# Patient Record
Sex: FEMALE | Race: WHITE | NOT HISPANIC OR LATINO | Employment: OTHER | URBAN - METROPOLITAN AREA
[De-identification: names, ages, dates, MRNs, and addresses within clinical notes are randomized per-mention and may not be internally consistent; named-entity substitution may affect disease eponyms.]

---

## 2017-02-04 ENCOUNTER — HOSPITAL ENCOUNTER (EMERGENCY)
Facility: CLINIC | Age: 67
Discharge: HOME | End: 2017-02-04
Attending: FAMILY MEDICINE | Admitting: FAMILY MEDICINE
Payer: MEDICARE

## 2017-02-04 VITALS
SYSTOLIC BLOOD PRESSURE: 138 MMHG | OXYGEN SATURATION: 97 % | TEMPERATURE: 97.9 F | DIASTOLIC BLOOD PRESSURE: 89 MMHG | HEART RATE: 76 BPM

## 2017-02-04 DIAGNOSIS — M54.5 ACUTE LOW BACK PAIN, UNSPECIFIED BACK PAIN LATERALITY, WITH SCIATICA PRESENCE UNSPECIFIED: ICD-10-CM

## 2017-02-04 LAB
ALBUMIN UR-MCNC: NEGATIVE MG/DL
APPEARANCE UR: CLEAR
BACTERIA SPEC CULT: NORMAL
BILIRUB UR QL STRIP: NEGATIVE
COLOR UR AUTO: ABNORMAL
GLUCOSE UR STRIP-MCNC: NEGATIVE MG/DL
HGB UR QL STRIP: NEGATIVE
KETONES UR STRIP-MCNC: NEGATIVE MG/DL
LEUKOCYTE ESTERASE UR QL STRIP: NEGATIVE
MICRO REPORT STATUS: NORMAL
NITRATE UR QL: NEGATIVE
PH UR STRIP: 7 PH (ref 5–7)
RBC #/AREA URNS AUTO: <1 /HPF (ref 0–2)
SP GR UR STRIP: 1 (ref 1–1.03)
SPECIMEN SOURCE: NORMAL
URN SPEC COLLECT METH UR: ABNORMAL
UROBILINOGEN UR STRIP-MCNC: NORMAL MG/DL (ref 0–2)
WBC #/AREA URNS AUTO: <1 /HPF (ref 0–2)

## 2017-02-04 PROCEDURE — 99283 EMERGENCY DEPT VISIT LOW MDM: CPT | Performed by: FAMILY MEDICINE

## 2017-02-04 PROCEDURE — 87086 URINE CULTURE/COLONY COUNT: CPT | Performed by: FAMILY MEDICINE

## 2017-02-04 PROCEDURE — 81001 URINALYSIS AUTO W/SCOPE: CPT | Performed by: FAMILY MEDICINE

## 2017-02-04 PROCEDURE — 99283 EMERGENCY DEPT VISIT LOW MDM: CPT

## 2017-02-04 RX ORDER — CIPROFLOXACIN 500 MG/1
500 TABLET, FILM COATED ORAL 2 TIMES DAILY
Qty: 14 TABLET | Refills: 0 | Status: SHIPPED | OUTPATIENT
Start: 2017-02-04 | End: 2017-02-11

## 2017-02-04 ASSESSMENT — ENCOUNTER SYMPTOMS
ABDOMINAL PAIN: 1
CARDIOVASCULAR NEGATIVE: 1
HEMATOLOGIC/LYMPHATIC NEGATIVE: 1
PSYCHIATRIC NEGATIVE: 1
ENDOCRINE NEGATIVE: 1
CONSTITUTIONAL NEGATIVE: 1
EYES NEGATIVE: 1
MUSCULOSKELETAL NEGATIVE: 1
ALLERGIC/IMMUNOLOGIC NEGATIVE: 1
NEUROLOGICAL NEGATIVE: 1
RESPIRATORY NEGATIVE: 1

## 2017-02-04 NOTE — ED AVS SNAPSHOT
[facility] Emergency Department    [address] [address]    Phone:  [phone]    Fax:  [phone]                                       Alona Sosa   MRN: [id number]    Department:  [facility] Emergency Department   Date of Visit:  2/4/2017           After Visit Summary Signature Page     I have received my discharge instructions, and my questions have been answered. I have discussed any challenges I see with this plan with the nurse or doctor.    ..........................................................................................................................................  Patient/Patient Representative Signature      ..........................................................................................................................................  Patient Representative Print Name and Relationship to Patient    ..................................................               ................................................  Date                                            Time    ..........................................................................................................................................  Reviewed by Signature/Title    ...................................................              ..............................................  Date                                                            Time

## 2017-02-04 NOTE — ED AVS SNAPSHOT
[facility] Emergency Department    [address] [address]    Phone:  [phone]    Fax:  [phone]                                       Alona Sosa   MRN: [id number]    Department:  [facility] Emergency Department   Date of Visit:  2/4/2017           Patient Information     Date Of Birth          1950        Your diagnoses for this visit were:     Acute low back pain, unspecified back pain laterality, with sciatica presence unspecified Etiology unclear       You were seen by Rashid Randolph MD.      Follow-up Information     Schedule an appointment as soon as possible for a visit with Tra Browning MD.    Specialty:  INTERNAL MEDICINE - ENDOCRINOLOGY, DIABETES & METABOLISM    Why:  As needed, If symptoms worsen    Contact information:    [facility]  [address]  [facility] [address] [phone]          Discharge Instructions       Empiric treatment with antibiotic pending results of urine culture.  Push fluids, rest.  If not improving return to the emergency.      24 Hour Appointment Hotline       To make an appointment at any [facility] clinic, call [phone] ([phone]). If you don't have a family doctor or clinic, we will help you find one. [facility] clinics are conveniently located to serve the needs of you and your family.             Review of your medicines      START taking        Dose / Directions Last dose taken    ciprofloxacin 500 MG tablet   Commonly known as:  CIPRO   Dose:  500 mg   Quantity:  14 tablet        Take 1 tablet (500 mg) by mouth 2 times daily for 7 days   Refills:  0          Our records show that you are taking the medicines listed below. If these are incorrect, please call your family doctor or clinic.        Dose / Directions Last dose taken    ARMOUR THYROID PO   Dose:  60 mg        Take 60 mg by mouth daily   Refills:  0        CLONIDINE HCL PO   Dose:  0.1 mg        Take 0.1 mg by mouth at onset of  headache  med   Refills:  0        co-enzyme Q-10 100 MG Caps capsule        Take  by mouth daily.   Refills:  0        fish oil-omega-3 fatty acids 1000 MG capsule   Dose:  2 g        Take 2 g by mouth daily.   Refills:  0        LEVOTHROID PO   Dose:  50 mcg        Take 50 mcg by mouth   Refills:  0        losartan-hydrochlorothiazide 100-12.5 MG per tablet   Commonly known as:  HYZAAR   Dose:  1 tablet   Quantity:  30 tablet        Take 1 tablet by mouth daily   Refills:  11        vitamin E 400 UNITS Tabs   Dose:  400 Units        Take 400 Units by mouth daily.   Refills:  0                Prescriptions were sent or printed at these locations (1 Prescription)                   [facility] [place], [place] - [address]   [address] [place] [address]    Telephone:  [phone]   Fax:  [phone]   Hours:                  E-Prescribed (1 of 1)         ciprofloxacin (CIPRO) 500 MG tablet                Procedures and tests performed during your visit     UA with Microscopic      Orders Needing Specimen Collection     None      Pending Results     No orders found from 2/3/2017 to 2017.            Pending Culture Results     No orders found from 2/3/2017 to 2017.       Test Results from your hospital stay           2017  4:17 PM - Interface, [place] Results      Component Results     Component Value Ref Range & Units Status    Color Urine Straw  Final    Appearance Urine Clear  Final    Glucose Urine Negative NEG mg/dL Final    Bilirubin Urine Negative NEG Final    Ketones Urine Negative NEG mg/dL Final    Specific Gravity Urine 1.002 (L) 1.003 - 1.035 Final    Blood Urine Negative NEG Final    pH Urine 7.0 5.0 - 7.0 pH Final    Protein Albumin Urine Negative NEG mg/dL Final    Urobilinogen mg/dL Normal 0.0 - 2.0 mg/dL Final    Nitrite Urine Negative NEG Final    Leukocyte Esterase Urine Negative NEG Final    Source Midstream Urine  Final    WBC Urine <1 0 - 2 /HPF  Final    RBC Urine <1 0 - 2 /HPF Final                Thank you for choosing [facility]       Thank you for choosing [facility] for your care. Our goal is always to provide you with excellent care. Hearing back from our patients is one way we can continue to improve our services. Please take a few minutes to complete the written survey that you may receive in the mail after you visit with us. Thank you!        [employer] Information     [facility] gives you secure access to your electronic health record. If you see a primary care provider, you can also send messages to your care team and make appointments. If you have questions, please call your primary care clinic.  If you do not have a primary care provider, please call [phone] and they will assist you.        Care EveryWhere ID     This is your Care EveryWhere ID. This could be used by other organizations to access your [facility] medical records  [id number]        After Visit Summary       This is your record. Keep this with you and show to your community pharmacist(s) and doctor(s) at your next visit.

## 2017-02-04 NOTE — DISCHARGE INSTRUCTIONS
Empiric treatment with antibiotic pending results of urine culture.  Push fluids, rest.  If not improving return to the emergency.

## 2017-02-04 NOTE — ED PROVIDER NOTES
History     Chief Complaint   Patient presents with     Flank Pain     uti 5 weeks ago now has low back pain and low abd pain     HPI  Alona Sosa is a 66 year old female, past medical history is significant for headache, hypertension, hypothyroidism, presents to the emergency department with concerns of bilateral low back pain and lower abdominal pain as well as urgency but no frequency or dysuria.  Symptoms will last 48 hours.  The patient states that beginning approximately 5 weeks ago she had urinary frequency urgency and dysuria which she treated with cranberry juice and fluids and seemed to improve.  She has had sporadic symptoms over the last couple of weeks up until the last 48 hours when symptoms seemed to increase.  She can think of no other reason to have low back pain or abdominal pain no lifting or straining pending etc.  She just returned from a cruise where she admittedly ate more than she should have.  She denies any diarrhea, no constipation.  No fever chills or sweats.    Active Ambulatory Problems     Diagnosis Date Noted     Headache 10/17/2005     Essential hypertension, malignant 06/08/2015     Vaccine refused by patient 10/25/2016     Resolved Ambulatory Problems     Diagnosis Date Noted     Closed fracture of metatarsal bone 03/20/2007     Past Medical History   Diagnosis Date     Unspecified hypothyroidism      Unspecified essential hypertension      No past surgical history on file.  Social History     Social History     Marital Status:      Spouse Name: N/A     Number of Children: N/A     Years of Education: N/A     Occupational History     Not on file.     Social History Main Topics     Smoking status: Former Smoker     Quit date: 09/29/1975     Smokeless tobacco: Never Used     Alcohol Use: Yes      Comment: OCC     Drug Use: No     Sexual Activity: Not on file     Other Topics Concern     Not on file     Social History Narrative     Family History   Problem Relation Age of  Onset     Hypertension Mother      Eye Disorder Mother      Respiratory Father      TB     CEREBROVASCULAR DISEASE Father      DIABETES Father      Alcohol/Drug Father      Hypertension Father      DIABETES Maternal Grandmother      CEREBROVASCULAR DISEASE Maternal Grandmother      Hypertension Maternal Grandmother      Blood Disease Maternal Grandmother      Respiratory Maternal Grandfather      TB     Alcohol/Drug Maternal Grandfather      Hypertension Sister      HEART DISEASE Sister      GASTROINTESTINAL DISEASE Daughter      Neurologic Disorder Daughter      BRAIN TUMOR     Hypertension Daughter      GASTROINTESTINAL DISEASE Daughter      No current facility-administered medications for this encounter.     Current Outpatient Prescriptions   Medication     ciprofloxacin (CIPRO) 500 MG tablet     Levothyroxine Sodium (LEVOTHROID PO)     ARMOUR THYROID PO     losartan-hydrochlorothiazide (HYZAAR) 100-12.5 MG per tablet     CLONIDINE HCL PO     co-enzyme Q-10 (COQ10) 100 MG CAPS     fish oil-omega-3 fatty acids (FISH OIL) 1000 MG capsule     vitamin E 400 UNIT TABS        Allergies   Allergen Reactions     Macrobid [Nitrofurantoin Anhydrous] Swelling       I have reviewed the Medications, Allergies, Past Medical and Surgical History, and Social History in the Epic system.    Review of Systems   Constitutional: Negative.    HENT: Negative.    Eyes: Negative.    Respiratory: Negative.    Cardiovascular: Negative.    Gastrointestinal: Positive for abdominal pain.   Endocrine: Negative.    Genitourinary: Positive for urgency and pelvic pain.   Musculoskeletal: Negative.    Skin: Negative.    Allergic/Immunologic: Negative.    Neurological: Negative.    Hematological: Negative.    Psychiatric/Behavioral: Negative.        Physical Exam   BP: (!) 177/92 mmHg  Pulse: 76  Temp: 97.9  F (36.6  C)  SpO2: 95 %  Physical Exam   Constitutional: She is oriented to person, place, and time. She appears well-developed and  well-nourished.   Does not appear ill or toxic.   Abdominal: Soft. Bowel sounds are normal. She exhibits no mass. There is tenderness. There is no rebound and no guarding.   Mild tenderness suprapubic area without rebound or guarding.  Minimal low back tenderness to palpation.  No CVA area tenderness.   Musculoskeletal: Normal range of motion.   Neurological: She is alert and oriented to person, place, and time.   Skin: Skin is warm and dry.   Psychiatric: She has a normal mood and affect. Her behavior is normal.   Nursing note and vitals reviewed.      ED Course   Procedures             Critical Care time:  none               Labs Ordered and Resulted from Time of ED Arrival Up to the Time of Departure from the ED   ROUTINE UA WITH MICROSCOPIC - Abnormal; Notable for the following:     Specific Gravity Urine 1.002 (*)     All other components within normal limits   URINE CULTURE AEROBIC BACTERIAL   5:10 PM  Lack of findings on urinalysis are reviewed with the patient.  Culture sent.  Patient has an abdominal exam which is benign, stable normal adult range vital signs.  My suspicion for an alternative more concerning diagnosis at this point is very low.  I discussed the option of evaluating this further with lab diagnostics with the patient versus empiric treatment and awaiting culture results.  She elected the latter.      Assessments & Plan (with Medical Decision Making)     I have reviewed the nursing notes.    I have reviewed the findings, diagnosis, plan and need for follow up with the patient.    New Prescriptions    CIPROFLOXACIN (CIPRO) 500 MG TABLET    Take 1 tablet (500 mg) by mouth 2 times daily for 7 days       Final diagnoses:   Acute low back pain, unspecified back pain laterality, with sciatica presence unspecified - Etiology unclear       2/4/2017   [facility] EMERGENCY DEPARTMENT      Rashid Randolph MD  02/04/17 5615

## 2017-02-06 LAB
BACTERIA SPEC CULT: NORMAL
MICRO REPORT STATUS: NORMAL
SPECIMEN SOURCE: NORMAL

## 2017-03-16 ENCOUNTER — HOSPITAL ENCOUNTER (EMERGENCY)
Facility: CLINIC | Age: 67
Discharge: HOME | End: 2017-03-16
Attending: EMERGENCY MEDICINE | Admitting: EMERGENCY MEDICINE
Payer: COMMERCIAL

## 2017-03-16 VITALS
HEART RATE: 61 BPM | WEIGHT: 158 LBS | TEMPERATURE: 98 F | DIASTOLIC BLOOD PRESSURE: 92 MMHG | RESPIRATION RATE: 18 BRPM | OXYGEN SATURATION: 97 % | BODY MASS INDEX: 25.12 KG/M2 | SYSTOLIC BLOOD PRESSURE: 132 MMHG

## 2017-03-16 DIAGNOSIS — G51.0 FACIAL NERVE PALSY: ICD-10-CM

## 2017-03-16 PROCEDURE — 99282 EMERGENCY DEPT VISIT SF MDM: CPT

## 2017-03-16 PROCEDURE — 99284 EMERGENCY DEPT VISIT MOD MDM: CPT | Performed by: EMERGENCY MEDICINE

## 2017-03-16 RX ORDER — PREDNISONE 20 MG/1
40 TABLET ORAL DAILY
Qty: 14 TABLET | Refills: 0 | Status: SHIPPED | OUTPATIENT
Start: 2017-03-16 | End: 2017-03-23

## 2017-03-16 NOTE — ED NOTES
States she can't take steroids because they make her bloat. Wants something different. Will talk to MD

## 2017-03-16 NOTE — ED AVS SNAPSHOT
[facility] Emergency Department    [address] [address]    Phone:  [phone]    Fax:  [phone]                                       Alona Sosa   MRN: [id number]    Department:  [facility] Emergency Department   Date of Visit:  3/16/2017           After Visit Summary Signature Page     I have received my discharge instructions, and my questions have been answered. I have discussed any challenges I see with this plan with the nurse or doctor.    ..........................................................................................................................................  Patient/Patient Representative Signature      ..........................................................................................................................................  Patient Representative Print Name and Relationship to Patient    ..................................................               ................................................  Date                                            Time    ..........................................................................................................................................  Reviewed by Signature/Title    ...................................................              ..............................................  Date                                                            Time

## 2017-03-16 NOTE — ED NOTES
"Pt here today concerned with left side facial tingling and droop, and possibly some on right as well. Nothing unusual with headache or dizziness, no issue swallowing, \"new glasses yesterday\".  Extremities  X 4 good, pt alert and aware of surroundings. No chest pain  "

## 2017-03-16 NOTE — ED AVS SNAPSHOT
[facility] Emergency Department    [address] [address]    Phone:  [phone]    Fax:  [phone]                                       Alona Sosa   MRN: [id number]    Department:  [facility] Emergency Department   Date of Visit:  3/16/2017           Patient Information     Date Of Birth          1950        Your diagnoses for this visit were:     Facial nerve palsy        You were seen by Charbel Kerr MD.        Discharge Instructions       Return to the emergency department if you have any change in your symptoms or other concerns.  I believe that your symptoms are likely related to slight weakness of the facial nerve.  Start taking aspirin once daily as well as prednisone for the next 7 days.  Follow-up with your primary care doctor in 5-7 days for a recheck.    24 Hour Appointment Hotline       To make an appointment at any [facility] clinic, call [phone]). If you don't have a family doctor or clinic, we will help you find one. [facility] clinics are conveniently located to serve the needs of you and your family.             Review of your medicines      START taking        Dose / Directions Last dose taken    aspirin 81 MG tablet   Dose:  81 mg        Take 1 tablet (81 mg) by mouth daily   Refills:  OTC        predniSONE 20 MG tablet   Commonly known as:  DELTASONE   Dose:  40 mg   Quantity:  14 tablet        Take 2 tablets (40 mg) by mouth daily for 7 days   Refills:  0          Our records show that you are taking the medicines listed below. If these are incorrect, please call your family doctor or clinic.        Dose / Directions Last dose taken    ARMOUR THYROID PO   Dose:  60 mg        Take 60 mg by mouth daily   Refills:  0        CLONIDINE HCL PO   Dose:  0.1 mg        Take 0.1 mg by mouth at onset of headache  med   Refills:  0        co-enzyme Q-10 100 MG Caps capsule        Take  by mouth daily.   Refills:  0        fish  oil-omega-3 fatty acids 1000 MG capsule   Dose:  2 g        Take 2 g by mouth daily.   Refills:  0        LEVOTHROID PO   Dose:  50 mcg        Take 50 mcg by mouth   Refills:  0        losartan-hydrochlorothiazide 100-12.5 MG per tablet   Commonly known as:  HYZAAR   Dose:  1 tablet   Quantity:  30 tablet        Take 1 tablet by mouth daily   Refills:  11        vitamin E 400 UNITS Tabs   Dose:  400 Units        Take 400 Units by mouth daily.   Refills:  0                Prescriptions were sent or printed at these locations (2 Prescriptions)                   [facility] [place] [facility] [address]   [address], [place] [address]    Telephone:  [phone]   Fax:  [phone]   Hours:  SAL SARAVIA-Prescribed (1 of 2)         predniSONE (DELTASONE) 20 MG tablet                 Not Printed or Sent (1 of 2)         aspirin 81 MG tablet                Orders Needing Specimen Collection     None      Pending Results     No orders found from 3/14/2017 to 3/17/2017.            Pending Culture Results     No orders found from 3/14/2017 to 3/17/2017.             Test Results from your hospital stay            Thank you for choosing [facility]       Thank you for choosing [facility] for your care. Our goal is always to provide you with excellent care. Hearing back from our patients is one way we can continue to improve our services. Please take a few minutes to complete the written survey that you may receive in the mail after you visit with us. Thank you!        [employer] Information     [employer] gives you secure access to your electronic health record. If you see a primary care provider, you can also send messages to your care team and make appointments. If you have questions, please call your primary care clinic.  If you do not have a primary care provider, please call [phone] and they will assist you.        Care EveryWhere ID     This is your Care  EveryWhere ID. This could be used by other organizations to access your [facility] medical records  [id number]        After Visit Summary       This is your record. Keep this with you and show to your community pharmacist(s) and doctor(s) at your next visit.

## 2017-03-16 NOTE — DISCHARGE INSTRUCTIONS
Return to the emergency department if you have any change in your symptoms or other concerns.  I believe that your symptoms are likely related to slight weakness of the facial nerve.  Start taking aspirin once daily as well as prednisone for the next 7 days.  Follow-up with your primary care doctor in 5-7 days for a recheck.

## 2017-03-16 NOTE — ED PROVIDER NOTES
History     Chief Complaint   Patient presents with     Facial Droop     L side of face, started yesterday     HPI  Alona Sosa is a 66 year old female who presents to the ED for concerns of left-sided facial drooping. Patient just returned from a three week trip in [place] this morning. When she got home she noticed in the mirror that the left side of her face seemed to be sagging. She also reports some tingling and warmth in her face, more on the left side than the right. Since she first noticed these symptoms, the sagging has not changed, but the tingling is more noticeable. Other symptoms include decreased appetite and mild nausea. She has never experienced anything like this before. She denies numbness or weakness anywhere else. No changes in speech or swallowing. She is able to walk normally. She denies fevers, chills, ear pain, chest pain, shortness of breath, vomiting, diarrhea, headache, or rash.    Past Medical History: HTN, hypothyroidism, vitiligo  Daily Medications: Levothyroxine, [place] thyroid  Allergies: Macrobid  Social History: None-smoker. Rare alcohol use.     I have reviewed the Medications, Allergies, Past Medical and Surgical History, and Social History in the Epic system.    Review of Systems  Pertinent positives and negatives listed in the HPI, all other review of systems negative.     Physical Exam   BP: 167/83  Pulse: 61  Temp: 98  F (36.7  C)  Resp: 18  Weight: 71.7 kg (158 lb)  SpO2: 96 %  Physical Exam   Constitutional: She is oriented to person, place, and time. She appears well-developed and well-nourished. She appears distressed.   HENT:   Head: Normocephalic and atraumatic.   Right Ear: External ear normal.   Left Ear: External ear normal.   Nose: Nose normal.   Eyes: Conjunctivae are normal. No scleral icterus.   Neck: Normal range of motion.   Cardiovascular: Normal rate and regular rhythm.    Pulmonary/Chest: Effort normal. No stridor. No respiratory distress.   Abdominal: Soft.  She exhibits no distension.   Neurological: She is alert and oriented to person, place, and time. She exhibits normal muscle tone. Coordination and gait normal. GCS eye subscore is 4. GCS verbal subscore is 5. GCS motor subscore is 6.   Mild loss of left labial fold and mild drooping of the left corner of the mouth, however the patient is able to smile symmetrically.  She does have some mild loss of her forehead crease, but is able to raise her eyebrows symmetrically.  No pronator drift.  No dysmetria.  No dysdiadochokinesis.  Normal tandem gait.  No visual field deficit.   Skin: Skin is warm and dry. She is not diaphoretic.   Psychiatric: She has a normal mood and affect. Her behavior is normal.   Nursing note and vitals reviewed.      ED Course     ED Course     Procedures             Critical Care time:  none               Labs Ordered and Resulted from Time of ED Arrival Up to the Time of Departure from the ED - No data to display    No results found for this or any previous visit (from the past 24 hour(s)).    Medications - No data to display    5:03 PM Patient assessed.    Assessments & Plan (with Medical Decision Making)   58-year-old female presents with concerns for left sided facial droop.  Differential includes Bell's palsy, stroke, intracranial hemorrhage, normal exam.  The patient is adamant that left side of her face looks different, and while it does appear asymmetric, she is able to move everything symmetrically.  I asked if she could provide some photos for comparison, and she had several photos on her phone, however they were low lighting and it did not show her face and rest, only smiling, and like stated above, she is able to move her face symmetrically. Given the lack of other findings, I think this is unlikely to be stroke.  We discussed MRI to be certain and the patient was adamant that she not have an MRI today.  At this point with her being certain that this is so different for her, I will  treat with prednisone daily for the next 7 days to see if this helps her symptoms.  No signs of shingles on examination.  I've also instructed to take a baby aspirin daily for now and to follow-up with her regular doctor in 5-7 days for a recheck.  The patient is in agreement with this plan.    I have reviewed the nursing notes.    I have reviewed the findings, diagnosis, plan and need for follow up with the patient.    New Prescriptions    ASPIRIN 81 MG TABLET    Take 1 tablet (81 mg) by mouth daily    PREDNISONE (DELTASONE) 20 MG TABLET    Take 2 tablets (40 mg) by mouth daily for 7 days       Final diagnoses:   Facial nerve palsy     This document serves as a record of the services and decisions personally performed and made by Charbel Kerr MD. It was created on HIS/HER behalf by Merle Sanders, a trained medical scribe. The creation of this document is based the provider's statements to the medical scribe.  Merle Sanders 5:01 PM 3/16/2017    Provider:   The information in this document, created by the medical scribe for me, accurately reflects the services I personally performed and the decisions made by me. I have reviewed and approved this document for accuracy prior to leaving the patient care area.  Charbel Kerr MD 5:01 PM 3/16/2017    3/16/2017   [facility] EMERGENCY DEPARTMENT     Charbel Kerr MD  03/16/17 [id number]

## 2018-04-15 NOTE — PROGRESS NOTES
History of Present Illness - Alona Sosa is a 67 year old female who presents with a 7 month history of tinnitus on the left. She also feels that her hearing is diminished on that side. She denies any associated vertigo or head trauma. She denies any ear pain.     Past Medical History -   Patient Active Problem List   Diagnosis     Headache     Essential hypertension, malignant     Vaccine refused by patient     Hypothyroidism     Vitiligo       Current Medications -   Current Outpatient Prescriptions:      Levothyroxine Sodium (LEVOTHROID PO), Take 50 mcg by mouth, Disp: , Rfl:      co-enzyme Q-10 (COQ10) 100 MG CAPS, Take  by mouth daily., Disp: , Rfl:      fish oil-omega-3 fatty acids (FISH OIL) 1000 MG capsule, Take 2 g by mouth daily., Disp: , Rfl:      vitamin E 400 UNIT TABS, Take 400 Units by mouth daily., Disp: , Rfl:      ARMOUR THYROID PO, Take 60 mg by mouth daily , Disp: , Rfl:      CLONIDINE HCL PO, Take 0.1 mg by mouth at onset of headache  med, Disp: , Rfl:     Allergies -   Allergies   Allergen Reactions     Macrobid [Nitrofurantoin Anhydrous] Swelling       Social History -   Social History     Social History     Marital status:      Spouse name: N/A     Number of children: N/A     Years of education: N/A     Social History Main Topics     Smoking status: Former Smoker     Quit date: 1975     Smokeless tobacco: Never Used     Alcohol use Yes      Comment: OCC     Drug use: No     Sexual activity: Not on file     Other Topics Concern     Not on file     Social History Narrative       Family History -   Family History   Problem Relation Age of Onset     Hypertension Mother      Eye Disorder Mother      Respiratory Father      TB     CEREBROVASCULAR DISEASE Father      DIABETES Father      Alcohol/Drug Father      Hypertension Father      DIABETES Maternal Grandmother      CEREBROVASCULAR DISEASE Maternal Grandmother      Hypertension Maternal Grandmother      Blood Disease  Maternal Grandmother      Respiratory Maternal Grandfather      TB     Alcohol/Drug Maternal Grandfather      Hypertension Sister      HEART DISEASE Sister      GASTROINTESTINAL DISEASE Daughter      Neurologic Disorder Daughter      BRAIN TUMOR     Hypertension Daughter      GASTROINTESTINAL DISEASE Daughter        Review of Systems - As per HPI and PMHx, otherwise 7 system review of the head and neck negative. 10+ system review negative.    Physical Exam  /84 (BP Location: Right arm, Patient Position: Chair, Cuff Size: Adult Regular)  Pulse 75  Temp 97.9  F (36.6  C) (Oral)  Wt 74.8 kg (165 lb)  BMI 26.24 kg/m2  General - The patient is well nourished and well developed, and appears to have good nutritional status.  Alert and oriented to person and place, answers questions and cooperates with examination appropriately.   Head and Face - Normocephalic and atraumatic, with no gross asymmetry noted of the contour of the facial features.  The facial nerve is intact, with strong symmetric movements.  Voice and Breathing - The patient was breathing comfortably without the use of accessory muscles. There was no wheezing, stridor, or stertor.  The patients voice was clear and strong, and had appropriate pitch and quality.  Ears - Bilateral pinna and EACs with normal appearing overlying skin. Tympanic membrane intact with good mobility on pneumatic otoscopy bilaterally. Bony landmarks of the ossicular chain are normal. The tympanic membranes are normal in appearance. No retraction, perforation, or masses.  No fluid or purulence was seen in the external canal or the middle ear.   Eyes - Extraocular movements intact.  Sclera were not icteric or injected, conjunctiva were pink and moist.  Mouth - Examination of the oral cavity showed pink, healthy oral mucosa. No lesions or ulcerations noted.  The tongue was mobile and midline, and the dentition were in good condition.    Throat - The walls of the oropharynx were  smooth, pink, moist, symmetric, and had no lesions or ulcerations.  The tonsillar pillars and soft palate were symmetric.  The uvula was midline on elevation.  Neck - Normal midline excursion of the laryngotracheal complex during swallowing.  Full range of motion on passive movement.  Palpation of the occipital, submental, submandibular, internal jugular chain, and supraclavicular nodes did not demonstrate any abnormal lymph nodes or masses.  The carotid pulse was palpable bilaterally.  Palpation of the thyroid was soft and smooth, with no nodules or goiter appreciated.  The trachea was mobile and midline.  Nose - External contour is symmetric, no gross deflection or scars.  Nasal mucosa is pink and moist with no abnormal mucus.  The septum was midline and non-obstructive, turbinates of normal size and position.  No polyps, masses, or purulence noted on examination.    Audiogram 04/17/18  Pure Tone Thresholds assessed using conventional audiometry with good  reliability from 250-8000 Hz bilaterally using circumaural headphones     RIGHT:  normal and moderate sensorineural hearing loss    LEFT:    mild and moderate sensorineural hearing loss     Tympanogram:    RIGHT: normal eardrum mobility, 1000 Hz ipsi/contra reflex present    LEFT:   normal eardrum mobility,  1000 Hz ipsi/contra reflex present     Speech Reception Threshold:    RIGHT: 25 dB HL    LEFT:   45 dB HL  Word Recognition Score:     RIGHT: 92% at 65 dB HL using W22 recorded word list.    LEFT:   68% at 75 dB HL using W22 recorded word list.        Assessment - Alona Sosa is a 67 year old female with asymmetric left SNHL. She is outside the window for steroid treatment. I recommended MRI Brain to evaluate for possible intracranial cause for the hearing loss. She wished to think about this for a couple of weeks. I advised her that there would not likely be any events in the next few weeks that would decrease the utility of the imaging.     Patient to  follow up with Primary Care provider regarding elevated blood pressure.    Dr. Marian Gould MD  Otolaryngology  [facility]

## 2018-04-16 ENCOUNTER — OFFICE VISIT (OUTPATIENT)
Dept: OTOLARYNGOLOGY | Facility: CLINIC | Age: 68
End: 2018-04-16
Payer: COMMERCIAL

## 2018-04-16 ENCOUNTER — OFFICE VISIT (OUTPATIENT)
Dept: AUDIOLOGY | Facility: CLINIC | Age: 68
End: 2018-04-16
Payer: COMMERCIAL

## 2018-04-16 VITALS
HEART RATE: 75 BPM | TEMPERATURE: 97.9 F | DIASTOLIC BLOOD PRESSURE: 84 MMHG | WEIGHT: 165 LBS | BODY MASS INDEX: 26.24 KG/M2 | SYSTOLIC BLOOD PRESSURE: 165 MMHG

## 2018-04-16 DIAGNOSIS — H90.3 ASYMMETRICAL SENSORINEURAL HEARING LOSS: Primary | ICD-10-CM

## 2018-04-16 DIAGNOSIS — H93.12 TINNITUS OF LEFT EAR: ICD-10-CM

## 2018-04-16 DIAGNOSIS — H90.3 SENSORINEURAL HEARING LOSS, ASYMMETRICAL: Primary | ICD-10-CM

## 2018-04-16 PROCEDURE — 99203 OFFICE O/P NEW LOW 30 MIN: CPT | Performed by: OTOLARYNGOLOGY

## 2018-04-16 PROCEDURE — 92550 TYMPANOMETRY & REFLEX THRESH: CPT | Performed by: AUDIOLOGIST

## 2018-04-16 PROCEDURE — 99207 ZZC NO CHARGE LOS: CPT | Performed by: AUDIOLOGIST

## 2018-04-16 PROCEDURE — 92557 COMPREHENSIVE HEARING TEST: CPT | Performed by: AUDIOLOGIST

## 2018-04-16 ASSESSMENT — PAIN SCALES - GENERAL: PAINLEVEL: NO PAIN (0)

## 2018-04-16 NOTE — MR AVS SNAPSHOT
After Visit Summary   4/16/2018    Alona Sosa    MRN: [id number]           Patient Information     Date Of Birth          1950        Visit Information        Provider Department      4/16/2018 1:15 PM Marian Gould MD [facility]        Today's Diagnoses     Asymmetrical sensorineural hearing loss    -  1      Care Instructions    Per physician's instructions            Follow-ups after your visit        Future tests that were ordered for you today     Open Future Orders        Priority Expected Expires Ordered    MR Brain w/o & w Contrast Routine  4/16/2019 4/16/2018            Who to contact     If you have questions or need follow up information about today's clinic visit or your schedule please contact [facility] directly at [phone].  Normal or non-critical lab and imaging results will be communicated to you by Tribridgehart, letter or phone within 4 business days after the clinic has received the results. If you do not hear from us within 7 days, please contact the clinic through Tribridgehart or phone. If you have a critical or abnormal lab result, we will notify you by phone as soon as possible.  Submit refill requests through Mer[employer] or call your pharmacy and they will forward the refill request to us. Please allow 3 business days for your refill to be completed.          Additional Information About Your Visit        MyChart Information     [employer] gives you secure access to your electronic health record. If you see a primary care provider, you can also send messages to your care team and make appointments. If you have questions, please call your primary care clinic.  If you do not have a primary care provider, please call [phone] and they will assist you.        Care EveryWhere ID     This is your Care EveryWhere ID. This could be used by other organizations to access your [place] medical records  [id number]        Your Vitals Were     Pulse  Temperature BMI (Body Mass Index)             75 97.9  F (36.6  C) (Oral) 26.24 kg/m2          Blood Pressure from Last 3 Encounters:   18 165/84   17 (!) 132/92   17 138/89    Weight from Last 3 Encounters:   18 74.8 kg (165 lb)   17 71.7 kg (158 lb)   10/25/16 70.8 kg (156 lb)               Primary Care Provider Office Phone # Fax #    Tra Browning [phone]       [facility] SPEC [address]  [place] [address]        Equal Access to Services     [facility] : Hadii cony castano hadasho Soani, waaxda luqadaha, qaybta kaalmada adecallumyada, emily gutierrez . So [facility] [phone].    ATENCIÓN: Si habla español, tiene a walker disposición servicios gratuitos de asistencia lingüística. Llame al [phone].    We comply with applicable federal civil rights laws and [place] laws. We do not discriminate on the basis of race, color, national origin, age, disability, sex, sexual orientation, or gender identity.            Thank you!     Thank you for choosing [facility]  for your care. Our goal is always to provide you with excellent care. Hearing back from our patients is one way we can continue to improve our services. Please take a few minutes to complete the written survey that you may receive in the mail after your visit with us. Thank you!             Your Updated Medication List - Protect others around you: Learn how to safely use, store and throw away your medicines at www.disposemymeds.org.          This list is accurate as of 18  2:29 PM.  Always use your most recent med list.                   Brand Name Dispense Instructions for use Diagnosis    ARMOUR THYROID PO      Take 60 mg by mouth daily        CLONIDINE HCL PO      Take 0.1 mg by mouth at onset of headache  med        co-enzyme Q-10 100 MG Caps capsule      Take  by mouth daily.    Hand pain       fish oil-omega-3 fatty acids 1000 MG  capsule      Take 2 g by mouth daily.    Hand pain       LEVOTHROID PO      Take 50 mcg by mouth        vitamin E 400 units Tabs      Take 400 Units by mouth daily.    Hand pain

## 2018-04-16 NOTE — MR AVS SNAPSHOT
After Visit Summary   4/16/2018    Alona Sosa    MRN: [id number]           Patient Information     Date Of Birth          1950        Visit Information        Provider Department      4/16/2018 1:30 PM Marjan Dasilva [facility]        Today's Diagnoses     Sensorineural hearing loss, asymmetrical    -  1    Tinnitus of left ear           Follow-ups after your visit        Future tests that were ordered for you today     Open Future Orders        Priority Expected Expires Ordered    MR Brain w/o & w Contrast Routine  4/16/2019 4/16/2018            Who to contact     If you have questions or need follow up information about today's clinic visit or your schedule please contact [facility] directly at [phone].  Normal or non-critical lab and imaging results will be communicated to you by BiOxyDynhart, letter or phone within 4 business days after the clinic has received the results. If you do not hear from us within 7 days, please contact the clinic through BiOxyDynhart or phone. If you have a critical or abnormal lab result, we will notify you by phone as soon as possible.  Submit refill requests through [employer] or call your pharmacy and they will forward the refill request to us. Please allow 3 business days for your refill to be completed.          Additional Information About Your Visit        MyChart Information     [employer] gives you secure access to your electronic health record. If you see a primary care provider, you can also send messages to your care team and make appointments. If you have questions, please call your primary care clinic.  If you do not have a primary care provider, please call [phone] and they will assist you.        Care EveryWhere ID     This is your Care EveryWhere ID. This could be used by other organizations to access your [place] medical records  [id number]         Blood Pressure from Last 3 Encounters:   04/16/18 165/84    17 (!) 132/92   17 138/89    Weight from Last 3 Encounters:   18 165 lb (74.8 kg)   17 158 lb (71.7 kg)   10/25/16 156 lb (70.8 kg)              We Performed the Following     AUDIOGRAM/TYMPANOGRAM - INTERFACE     COMPREHENSIVE HEARING TEST     TYMPANOMETRY AND REFLEX THRESHOLD MEASUREMENTS        Primary Care Provider Office Phone # Fax #    Tra Browning [phone]       [facility] SPEC [address]  [facility] [address]        Equal Access to Services     [facility]: Hadii aad ku hadasho Soomaali, waaxda luqadaha, qaybta kaalmada adeegyada, emily gutierrez . So [facility] [phone].    ATENCIÓN: Si habla español, tiene a walker disposición servicios gratuitos de asistencia lingüística. [facility] [phone].    We comply with applicable federal civil rights laws and [place] laws. We do not discriminate on the basis of race, color, national origin, age, disability, sex, sexual orientation, or gender identity.            Thank you!     Thank you for choosing [facility]  for your care. Our goal is always to provide you with excellent care. Hearing back from our patients is one way we can continue to improve our services. Please take a few minutes to complete the written survey that you may receive in the mail after your visit with us. Thank you!             Your Updated Medication List - Protect others around you: Learn how to safely use, store and throw away your medicines at www.disposemymeds.org.          This list is accurate as of 18  2:49 PM.  Always use your most recent med list.                   Brand Name Dispense Instructions for use Diagnosis    ARMOUR THYROID PO      Take 60 mg by mouth daily        CLONIDINE HCL PO      Take 0.1 mg by mouth at onset of headache  med        co-enzyme Q-10 100 MG Caps capsule      Take  by mouth daily.    Hand pain       fish oil-omega-3 fatty acids 1000 MG  capsule      Take 2 g by mouth daily.    Hand pain       LEVOTHROID PO      Take 50 mcg by mouth        vitamin E 400 units Tabs      Take 400 Units by mouth daily.    Hand pain

## 2018-04-16 NOTE — PROGRESS NOTES
AUDIOLOGY REPORT    SUBJECTIVE:  Alona Sosa is a 67 year old female who was seen in the Audiology Clinic at [facility] for an audiologic evaluation, referred by DR. Gould.  No previous audiograms are available at today's appointment.  The patient reports a 7 month history of left ear tinnitus and decreased hearing. Patient reports a history of occupational noise exposure for which ear protection was worn. The patient denies bilateral otalgia and bilateral drainage.     OBJECTIVE:  Otoscopic exam indicates ears are clear of cerumen bilaterally     Pure Tone Thresholds assessed using conventional audiometry with good  reliability from 250-8000 Hz bilaterally using circumaural headphones     RIGHT:  normal and moderate sensorineural hearing loss    LEFT:    mild and moderate sensorineural hearing loss    Tympanogram:    RIGHT: normal eardrum mobility, 1000 Hz ipsi/contra reflex present    LEFT:   normal eardrum mobility,  1000 Hz ipsi/contra reflex present    Speech Reception Threshold:    RIGHT: 25 dB HL    LEFT:   45 dB HL  Word Recognition Score:     RIGHT: 92% at 65 dB HL using W22 recorded word list.    LEFT:   68% at 75 dB HL using W22 recorded word list.      ASSESSMENT:   Normal hearing through 1000 Hz sloping to a moderate sensorineural hearing loss in the right ear with a mild to moderate sensorineural hearing loss in the left ear.     Today s results were discussed with the patient in detail.     PLAN: It is recommended that the patient be seen by Dr. Gould for medical evaluation of their ears and hearing evaluation.  Reviewed possible origins of tinnitus and strategies for management. Patient was counseled regarding hearing loss and impact on communication. Please call this clinic with questions regarding these results or recommendations.        Marjan Dasilva M.A. SHAUN-AAA  Clinical audiologist [place] # [id number]  4/16/2018

## 2018-04-16 NOTE — NURSING NOTE
"Initial /84 (BP Location: Right arm, Patient Position: Chair, Cuff Size: Adult Regular)  Pulse 75  Temp 97.9  F (36.6  C) (Oral)  Wt 74.8 kg (165 lb)  BMI 26.24 kg/m2 Estimated body mass index is 26.24 kg/(m^2) as calculated from the following:    Height as of 11/30/15: 1.689 m (5' 6.5\").    Weight as of this encounter: 74.8 kg (165 lb). .    Karyn Alvarez LPN    "

## 2018-04-16 NOTE — LETTER
2018         RE: Alona Sosa  [address]  [facility] [address]        Dear Colleague,    Thank you for referring your patient, Alona Sosa, to the [facility]. Please see a copy of my visit note below.        History of Present Illness - Alona Sosa is a 67 year old female who presents with a 7 month history of tinnitus on the left. She also feels that her hearing is diminished on that side. She denies any associated vertigo or head trauma. She denies any ear pain.     Past Medical History -   Patient Active Problem List   Diagnosis     Headache     Essential hypertension, malignant     Vaccine refused by patient     Hypothyroidism     Vitiligo       Current Medications -   Current Outpatient Prescriptions:      Levothyroxine Sodium (LEVOTHROID PO), Take 50 mcg by mouth, Disp: , Rfl:      co-enzyme Q-10 (COQ10) 100 MG CAPS, Take  by mouth daily., Disp: , Rfl:      fish oil-omega-3 fatty acids (FISH OIL) 1000 MG capsule, Take 2 g by mouth daily., Disp: , Rfl:      vitamin E 400 UNIT TABS, Take 400 Units by mouth daily., Disp: , Rfl:      ARMOUR THYROID PO, Take 60 mg by mouth daily , Disp: , Rfl:      CLONIDINE HCL PO, Take 0.1 mg by mouth at onset of headache  med, Disp: , Rfl:     Allergies -   Allergies   Allergen Reactions     Macrobid [Nitrofurantoin Anhydrous] Swelling       Social History -   Social History     Social History     Marital status:      Spouse name: N/A     Number of children: N/A     Years of education: N/A     Social History Main Topics     Smoking status: Former Smoker     Quit date: 1975     Smokeless tobacco: Never Used     Alcohol use Yes      Comment: OCC     Drug use: No     Sexual activity: Not on file     Other Topics Concern     Not on file     Social History Narrative       Family History -   Family History   Problem Relation Age of Onset     Hypertension Mother      Eye Disorder Mother      Respiratory Father      TB      CEREBROVASCULAR DISEASE Father      DIABETES Father      Alcohol/Drug Father      Hypertension Father      DIABETES Maternal Grandmother      CEREBROVASCULAR DISEASE Maternal Grandmother      Hypertension Maternal Grandmother      Blood Disease Maternal Grandmother      Respiratory Maternal Grandfather      TB     Alcohol/Drug Maternal Grandfather      Hypertension Sister      HEART DISEASE Sister      GASTROINTESTINAL DISEASE Daughter      Neurologic Disorder Daughter      BRAIN TUMOR     Hypertension Daughter      GASTROINTESTINAL DISEASE Daughter        Review of Systems - As per HPI and PMHx, otherwise 7 system review of the head and neck negative. 10+ system review negative.    Physical Exam  /84 (BP Location: Right arm, Patient Position: Chair, Cuff Size: Adult Regular)  Pulse 75  Temp 97.9  F (36.6  C) (Oral)  Wt 74.8 kg (165 lb)  BMI 26.24 kg/m2  General - The patient is well nourished and well developed, and appears to have good nutritional status.  Alert and oriented to person and place, answers questions and cooperates with examination appropriately.   Head and Face - Normocephalic and atraumatic, with no gross asymmetry noted of the contour of the facial features.  The facial nerve is intact, with strong symmetric movements.  Voice and Breathing - The patient was breathing comfortably without the use of accessory muscles. There was no wheezing, stridor, or stertor.  The patients voice was clear and strong, and had appropriate pitch and quality.  Ears - Bilateral pinna and EACs with normal appearing overlying skin. Tympanic membrane intact with good mobility on pneumatic otoscopy bilaterally. Bony landmarks of the ossicular chain are normal. The tympanic membranes are normal in appearance. No retraction, perforation, or masses.  No fluid or purulence was seen in the external canal or the middle ear.   Eyes - Extraocular movements intact.  Sclera were not icteric or injected, conjunctiva were  pink and moist.  Mouth - Examination of the oral cavity showed pink, healthy oral mucosa. No lesions or ulcerations noted.  The tongue was mobile and midline, and the dentition were in good condition.    Throat - The walls of the oropharynx were smooth, pink, moist, symmetric, and had no lesions or ulcerations.  The tonsillar pillars and soft palate were symmetric.  The uvula was midline on elevation.  Neck - Normal midline excursion of the laryngotracheal complex during swallowing.  Full range of motion on passive movement.  Palpation of the occipital, submental, submandibular, internal jugular chain, and supraclavicular nodes did not demonstrate any abnormal lymph nodes or masses.  The carotid pulse was palpable bilaterally.  Palpation of the thyroid was soft and smooth, with no nodules or goiter appreciated.  The trachea was mobile and midline.  Nose - External contour is symmetric, no gross deflection or scars.  Nasal mucosa is pink and moist with no abnormal mucus.  The septum was midline and non-obstructive, turbinates of normal size and position.  No polyps, masses, or purulence noted on examination.    Audiogram 04/17/18  Pure Tone Thresholds assessed using conventional audiometry with good  reliability from 250-8000 Hz bilaterally using circumaural headphones     RIGHT:  normal and moderate sensorineural hearing loss    LEFT:    mild and moderate sensorineural hearing loss     Tympanogram:    RIGHT: normal eardrum mobility, 1000 Hz ipsi/contra reflex present    LEFT:   normal eardrum mobility,  1000 Hz ipsi/contra reflex present     Speech Reception Threshold:    RIGHT: 25 dB HL    LEFT:   45 dB HL  Word Recognition Score:     RIGHT: 92% at 65 dB HL using W22 recorded word list.    LEFT:   68% at 75 dB HL using W22 recorded word list.        Assessment - Alona Sosa is a 67 year old female with asymmetric left SNHL. She is outside the window for steroid treatment. I recommended MRI Brain to evaluate for  possible intracranial cause for the hearing loss. She wished to think about this for a couple of weeks. I advised her that there would not likely be any events in the next few weeks that would decrease the utility of the imaging.     Patient to follow up with Primary Care provider regarding elevated blood pressure.    Dr. Marian Gould MD  Otolaryngology  [facility]        Again, thank you for allowing me to participate in the care of your patient.        Sincerely,        Marian Gould MD

## 2018-05-16 ENCOUNTER — HOSPITAL ENCOUNTER (OUTPATIENT)
Dept: MRI IMAGING | Facility: CLINIC | Age: 68
Discharge: HOME | End: 2018-05-16
Attending: INTERNAL MEDICINE | Admitting: INTERNAL MEDICINE
Payer: MEDICARE

## 2018-05-16 PROCEDURE — 70551 MRI BRAIN STEM W/O DYE: CPT

## 2018-05-16 RX ORDER — GADOBUTROL 604.72 MG/ML
7 INJECTION INTRAVENOUS ONCE
Status: DISCONTINUED | OUTPATIENT
Start: 2018-05-16 | End: 2018-05-16

## 2018-06-26 ENCOUNTER — RADIANT APPOINTMENT (OUTPATIENT)
Dept: GENERAL RADIOLOGY | Facility: CLINIC | Age: 68
End: 2018-06-26
Attending: FAMILY MEDICINE
Payer: COMMERCIAL

## 2018-06-26 ENCOUNTER — OFFICE VISIT (OUTPATIENT)
Dept: ORTHOPEDICS | Facility: CLINIC | Age: 68
End: 2018-06-26
Payer: COMMERCIAL

## 2018-06-26 VITALS
BODY MASS INDEX: 24.48 KG/M2 | DIASTOLIC BLOOD PRESSURE: 78 MMHG | SYSTOLIC BLOOD PRESSURE: 142 MMHG | WEIGHT: 156 LBS | HEIGHT: 67 IN

## 2018-06-26 DIAGNOSIS — M25.561 CHRONIC PAIN OF RIGHT KNEE: Primary | ICD-10-CM

## 2018-06-26 DIAGNOSIS — G89.29 CHRONIC PAIN OF RIGHT KNEE: Primary | ICD-10-CM

## 2018-06-26 DIAGNOSIS — M25.561 CHRONIC PAIN OF RIGHT KNEE: ICD-10-CM

## 2018-06-26 DIAGNOSIS — M17.0 PRIMARY OSTEOARTHRITIS OF BOTH KNEES: ICD-10-CM

## 2018-06-26 DIAGNOSIS — G89.29 CHRONIC PAIN OF RIGHT KNEE: ICD-10-CM

## 2018-06-26 PROCEDURE — 73562 X-RAY EXAM OF KNEE 3: CPT

## 2018-06-26 PROCEDURE — 99203 OFFICE O/P NEW LOW 30 MIN: CPT | Performed by: FAMILY MEDICINE

## 2018-06-26 NOTE — PROGRESS NOTES
"Alona Sosa  :  1950  DOS: 2018  MRN: [id number]    Sports Medicine Clinic Visit    PCP: Tra Browning    Alona Sosa is a 67 year old female who is seen as a self referral presenting with chronic right knee pain.    Injury: Gradual onset of right knee pain over the last ~ 2 years, pain mildly improved over last ~ 2 weeks.  Pain located over right anterior, medial knee, nonradiating.  Additional Features:  Positive: grinding and weakness.  Symptoms are better with Rest and exercise program.  Symptoms are worse with: kneeling, going from sit to stand.  Other evaluation and/or treatments so far consists of: Ibuprofen, Rest and exercise program.  Recent imaging completed: No recent imaging completed.  Prior History of related problems: none    Social History: retired     Review of Systems  Musculoskeletal: as above  Remainder of review of systems is negative including constitutional, CV, pulmonary, GI, Skin and Neurologic except as noted in HPI or medical history.    Past Medical History:   Diagnosis Date     Closed fracture of metatarsal bone 3/20/2007     Problem list name updated by automated process. Provider to review     Unspecified essential hypertension      Unspecified hypothyroidism      No past surgical history on file.    Objective  /78  Ht 5' 6.5\" (1.689 m)  Wt 156 lb (70.8 kg)  BMI 24.8 kg/m2    General: healthy, alert and in no distress    HEENT: no scleral icterus or conjunctival erythema   Skin: no suspicious lesions or rash. No jaundice.   CV: regular rhythm by palpation, 2+ distal pulses, no pedal edema    Resp: normal respiratory effort without conversational dyspnea   Psych: normal mood and affect    Gait: minimally antalgic, appropriate coordination and balance   Neuro: normal light touch sensory exam of the extremities. Motor strength as noted below     Right Knee exam    ROM:        Flexion 140 degrees, symmetric       Extension 0 degrees       Mild pain in " terminal flexion    Inspection:       no visible ecchymosis        effusion noted trace    Skin:       no visible deformities       well perfused       capillary refill brisk    Patellar Motion:        No significant lateral tilt noted in patella       Crepitus noted in the patellofemoral joint    Tender:        lateral patellar border       medial joint line    Non Tender:         remainder of knee area        medial patellar border        lateral joint line        along MCL        distal IT Band        infrapatellar tendon        tibial tubercle       pes anserine bursa    Special Tests:        Painful medial Rajesh       neg (-) Lachmans       neg (-) anterior drawer       neg (-) posterior drawer       + PF grind       neg (-) varus at 0 deg and 30 deg       neg (-) valgus at 0 deg and 30 deg    Evaluation of ipsilateral kinetic chain       normal strength with hip extension and abduction, but somewhat deconditioned on R, especially with quad activation      Radiology  XR images independently visualized and reviewed with patient today in clinic  Moderate right medial compartment narrowing with subchondral sclerosis, mild left medial compartment narrowing, small medial PF compartment osteophytes  No other acute findings, will follow final radiology read    Assessment:  1. Chronic pain of right knee    2. Primary osteoarthritis of both knees        Plan:  Discussed the assessment with the patient.  Follow up: prn  Discussed HEP and activity strategies  PT ordered to fine-tune HEP  Low impact activity reviewed  Reviewed wt loss, activity modification and progressive increase in activity as tolerated and guided by pain  Reviewed options for potential steroid vs viscosupplementation injections and the possibility for future orthopedic referral prn  Reviewed safe and appropriate OTC medication choices, try tylenol first  Up to 3000mg daily of tylenol is generally safe, NSAID dosing and duration limitations  reviewed  Discussed nature of degenerative arthrosis of the knee.   Discussed symptom treatment with ice or heat, topical treatments, and rest if needed.   We discussed modified progressive pain-free activity as tolerated  Home handouts provided and supportive care reviewed  All questions were answered today  Contact us with additional questions or concerns  Signs and sx of concern reviewed      Neymar Osman DO, CAYDEN  Primary Care Sports Medicine  [facility]                 Disclaimer: This note consists of symbols derived from keyboarding, dictation and/or voice recognition software. As a result, there may be errors in the script that have gone undetected. Please consider this when interpreting information found in this chart.

## 2018-06-26 NOTE — MR AVS SNAPSHOT
"              After Visit Summary   6/26/2018    Alona Sosa    MRN: [id number]           Patient Information     Date Of Birth          1950        Visit Information        Provider Department      6/26/2018 8:20 AM Neymar Osman,  [facility]        Today's Diagnoses     Chronic pain of right knee    -  1    Primary osteoarthritis of both knees          Care Instructions    Patient to follow up with Primary Care provider regarding elevated blood pressure.          Follow-ups after your visit        Additional Services     PHYSICAL THERAPY REFERRAL       *This therapy referral will be filtered to a centralized scheduling office at [facility] and the patient will receive a call to schedule an appointment at a [facility] location most convenient for them. *     [facility] provides Physical Therapy evaluation and treatment and many specialty services across the [facility] system.  If requesting a specialty program, please choose from the list below.    If you have not heard from the scheduling office within 2 business days, please call [phone] for all locations, with the exception of [place], please call [phone] and [facility], please call [phone]  Treatment: Evaluation & Treatment  Special Instructions/Modalities: work on safe HEP for knee and hip and core stabilization  Special Programs: None    Please be aware that coverage of these services is subject to the terms and limitations of your health insurance plan.  Call member services at your health plan with any benefit or coverage questions.      **Note to Provider:  If you are referring outside of [facility] for the therapy appointment, please list the name of the location in the \"special instructions\" above, print the referral and give to the patient to schedule the appointment.                  Who to contact     If you have questions or need follow up " "information about today's clinic visit or your schedule please contact [facility] directly at [phone].  Normal or non-critical lab and imaging results will be communicated to you by MyChart, letter or phone within 4 business days after the clinic has received the results. If you do not hear from us within 7 days, please contact the clinic through [employer] or phone. If you have a critical or abnormal lab result, we will notify you by phone as soon as possible.  Submit refill requests through [employer] or call your pharmacy and they will forward the refill request to us. Please allow 3 business days for your refill to be completed.          Additional Information About Your Visit        [employer] Information     [employer] gives you secure access to your electronic health record. If you see a primary care provider, you can also send messages to your care team and make appointments. If you have questions, please call your primary care clinic.  If you do not have a primary care provider, please call [phone] and they will assist you.        Care EveryWhere ID     This is your Care EveryWhere ID. This could be used by other organizations to access your [facility] medical records  [id number]        Your Vitals Were     Height BMI (Body Mass Index)                5' 6.5\" (1.689 m) 24.8 kg/m2           Blood Pressure from Last 3 Encounters:   06/26/18 142/78   04/16/18 165/84   03/16/17 (!) 132/92    Weight from Last 3 Encounters:   06/26/18 156 lb (70.8 kg)   04/16/18 165 lb (74.8 kg)   03/16/17 158 lb (71.7 kg)              We Performed the Following     PHYSICAL THERAPY REFERRAL        Primary Care Provider Office Phone # Fax #    Tra Browning [phone]       [facility] [address]  [facility] [address]        Equal Access to Services     LUL WISE : Hadii cony dillono Soani, waaxda luqadaha, qaybta kaalmada freddyyavu, emily lugo " freddy zieglermaidafeliz valdes'aaayan ah. So [facility] [phone].    ATENCIÓN: Si gina gaviria, tiene a walker disposición servicios gratuitos de asistencia lingüística. Marlyn al [phone].    We comply with applicable federal civil rights laws and [place] laws. We do not discriminate on the basis of race, color, national origin, age, disability, sex, sexual orientation, or gender identity.            Thank you!     Thank you for choosing [facility]  for your care. Our goal is always to provide you with excellent care. Hearing back from our patients is one way we can continue to improve our services. Please take a few minutes to complete the written survey that you may receive in the mail after your visit with us. Thank you!             Your Updated Medication List - Protect others around you: Learn how to safely use, store and throw away your medicines at www.disposemymeds.org.          This list is accurate as of 18 10:06 AM.  Always use your most recent med list.                   Brand Name Dispense Instructions for use Diagnosis    ARMOUR THYROID PO      Take 60 mg by mouth daily        CLONIDINE HCL PO      Take 0.1 mg by mouth at onset of headache  med        co-enzyme Q-10 100 MG Caps capsule      Take  by mouth daily.    Hand pain       fish oil-omega-3 fatty acids 1000 MG capsule      Take 2 g by mouth daily.    Hand pain       LEVOTHROID PO      Take 50 mcg by mouth        vitamin E 400 units Tabs      Take 400 Units by mouth daily.    Hand pain

## 2018-06-26 NOTE — Clinical Note
"    2018         RE: Alona Sosa  [address] [facility] [address]        Dear Colleague,    Thank you for referring your patient, Alona Sosa, to the [facility]. Please see a copy of my visit note below.    Alona Sosa  :  1950  DOS: 2018  MRN: [id number]    Sports Medicine Clinic Visit    PCP: Tra Browning    Alona Sosa is a 67 year old female who is seen as a self referral presenting with chronic right knee pain.    Injury: Gradual onset of right knee pain over the last ~ 2 years, pain mildly improved over last ~ 2 weeks.  Pain located over right anterior, medial knee, nonradiating.  Additional Features:  Positive: grinding and weakness.  Symptoms are better with Rest and exercise program.  Symptoms are worse with: kneeling, going from sit to stand.  Other evaluation and/or treatments so far consists of: Ibuprofen, Rest and exercise program.  Recent imaging completed: No recent imaging completed.  Prior History of related problems: none    Social History: retired     Review of Systems  Musculoskeletal: as above  Remainder of review of systems is negative including constitutional, CV, pulmonary, GI, Skin and Neurologic except as noted in HPI or medical history.    Past Medical History:   Diagnosis Date     Closed fracture of metatarsal bone 3/20/2007     Problem list name updated by automated process. Provider to review     Unspecified essential hypertension      Unspecified hypothyroidism      No past surgical history on file.    Objective  /78  Ht 5' 6.5\" (1.689 m)  Wt 156 lb (70.8 kg)  BMI 24.8 kg/m2    General: healthy, alert and in no distress    HEENT: no scleral icterus or conjunctival erythema   Skin: no suspicious lesions or rash. No jaundice.   CV: regular rhythm by palpation, 2+ distal pulses, no pedal edema    Resp: normal respiratory effort without conversational dyspnea   Psych: normal mood and affect    Gait: " ***antalgic, appropriate coordination and balance   Neuro: normal light touch sensory exam of the extremities. Motor strength as noted below     Right Knee exam    ROM:        Flexion 140 degrees, symmetric       Extension 0 degrees       Mild pain in terminal flexion    Inspection:       no visible ecchymosis        effusion noted trace    Skin:       no visible deformities       well perfused       capillary refill brisk    Patellar Motion:        No significant lateral tilt noted in patella       Crepitus noted in the patellofemoral joint    Tender:        lateral patellar border       medial joint line    Non Tender:         remainder of knee area        medial patellar border        lateral joint line        along MCL        distal IT Band        infrapatellar tendon        tibial tubercle       pes anserine bursa    Special Tests:        Painful medial Rajesh       neg (-) Lachmans       neg (-) anterior drawer       neg (-) posterior drawer       + PF grind       neg (-) varus at 0 deg and 30 deg       neg (-) valgus at 0 deg and 30 deg    Evaluation of ipsilateral kinetic chain       normal strength with hip extension and abduction, but somewhat deconditioned on R, especially with quad activation      Radiology  XR images independently visualized and reviewed with patient today in clinic  Moderate right medial compartment narrowing with subchondral sclerosis, mild left medial compartment narrowing, small medial PF compartment osteophytes  No other acute findings, will follow final radiology read    Assessment:  1. Chronic pain of right knee    2. Primary osteoarthritis of both knees        Plan:  Discussed the assessment with the patient.  Follow up: prn  Discussed HEP and activity strategies  PT ordered to fine-tune HEP  Low impact activity reviewed  Reviewed wt loss, activity modification and progressive increase in activity as tolerated and guided by pain  Reviewed options for potential steroid vs  viscosupplementation injections and the possibility for future orthopedic referral prn  Reviewed safe and appropriate OTC medication choices, try tylenol first  Up to 3000mg daily of tylenol is generally safe, NSAID dosing and duration limitations reviewed  Discussed nature of degenerative arthrosis of the knee.   Discussed symptom treatment with ice or heat, topical treatments, and rest if needed.   We discussed modified progressive pain-free activity as tolerated  Home handouts provided and supportive care reviewed  All questions were answered today  Contact us with additional questions or concerns  Signs and sx of concern reviewed      Neymar Osman DO, CAQ  Primary Care Sports Medicine  [facility]                 Disclaimer: This note consists of symbols derived from keyboarding, dictation and/or voice recognition software. As a result, there may be errors in the script that have gone undetected. Please consider this when interpreting information found in this chart.    Again, thank you for allowing me to participate in the care of your patient.        Sincerely,        Neymar Osman DO

## 2018-08-09 ENCOUNTER — OFFICE VISIT (OUTPATIENT)
Dept: ORTHOPEDICS | Facility: CLINIC | Age: 68
End: 2018-08-09
Payer: COMMERCIAL

## 2018-08-09 DIAGNOSIS — M17.0 PRIMARY OSTEOARTHRITIS OF BOTH KNEES: Primary | ICD-10-CM

## 2018-08-09 DIAGNOSIS — M25.561 CHRONIC PAIN OF RIGHT KNEE: ICD-10-CM

## 2018-08-09 DIAGNOSIS — G89.29 CHRONIC PAIN OF RIGHT KNEE: ICD-10-CM

## 2018-08-09 PROCEDURE — 20611 DRAIN/INJ JOINT/BURSA W/US: CPT | Mod: RT | Performed by: FAMILY MEDICINE

## 2018-08-09 RX ADMIN — ROPIVACAINE HYDROCHLORIDE 3 ML: 5 INJECTION, SOLUTION EPIDURAL; INFILTRATION; PERINEURAL at 17:00

## 2018-08-09 RX ADMIN — TRIAMCINOLONE ACETONIDE 40 MG: 40 INJECTION, SUSPENSION INTRA-ARTICULAR; INTRAMUSCULAR at 17:00

## 2018-08-09 NOTE — LETTER
2018         RE: Alona Sosa  [address] [facility] [address]        Dear Colleague,    Thank you for referring your patient, Alona Sosa, to the [facility]. Please see a copy of my visit note below.    Alona Sosa  :  1950  DOS: 2018  MRN: [id number]    Sports Medicine Clinic Procedure    Ultrasound Guided Right Intra-Articular Knee Aspiration & Injection    Clinical History: Patient reports minimal improvement in right knee pain with her HEP over the last ~ 6 weeks.  Desiring steroid injection as previously discussed on  prior to leaving for trip to [place] next week.    Diagnosis:   1. Primary osteoarthritis of both knees    2. Chronic pain of right knee        Large Joint Injection/Arthocentesis  Date/Time: 2018 5:00 PM  Performed by: NEYMAR ALATORRE  Authorized by: NEYMAR ALATORRE     Indications:  Osteoarthritis  Guidance: ultrasound    Approach:  Superolateral  Location:  Knee  Site:  R knee joint  Medications:  40 mg triamcinolone acetonide 40 MG/ML; 3 mL ropivacaine 5 MG/ML  Outcome:  Tolerated well, no immediate complications  Procedure discussed: discussed risks, benefits, and alternatives    Consent Given by:  Patient  Prep: patient was prepped and draped in usual sterile fashion            Impression:  Successful Right intra-articular knee aspiration and injection.    Plan:  Follow up as needed based on clinical progress and duration of benefit  Expectations and goals of CSI reviewed  Often 2-3 days for steroid effect, and can take up to two weeks for maximum effect  We discussed modified progressive pain-free activity as tolerated  Do not overuse in first two weeks if feeling better due to concern for vulnerability while steroid is working  Supportive care reviewed  All questions were answered today  Contact us with additional questions or concerns  Signs and sx of concern reviewed      Neymar Alatorre DO,  CA  Primary Care Sports Medicine  [facility]         Again, thank you for allowing me to participate in the care of your patient.        Sincerely,        Neymar Osman, DO

## 2018-08-09 NOTE — MR AVS SNAPSHOT
After Visit Summary   8/9/2018    Alona Sosa    MRN: [id number]           Patient Information     Date Of Birth          1950        Visit Information        Provider Department      8/9/2018 4:20 PM Neymar Osman DO [facility]        Today's Diagnoses     Primary osteoarthritis of both knees    -  1    Chronic pain of right knee           Follow-ups after your visit        Who to contact     If you have questions or need follow up information about today's clinic visit or your schedule please contact [facility] directly at [phone].  Normal or non-critical lab and imaging results will be communicated to you by [employer], letter or phone within 4 business days after the clinic has received the results. If you do not hear from us within 7 days, please contact the clinic through [employer] or phone. If you have a critical or abnormal lab result, we will notify you by phone as soon as possible.  Submit refill requests through [employer] or call your pharmacy and they will forward the refill request to us. Please allow 3 business days for your refill to be completed.          Additional Information About Your Visit        MyChart Information     [employer] gives you secure access to your electronic health record. If you see a primary care provider, you can also send messages to your care team and make appointments. If you have questions, please call your primary care clinic.  If you do not have a primary care provider, please call [phone] and they will assist you.        Care EveryWhere ID     This is your Care EveryWhere ID. This could be used by other organizations to access your [facility] medical records  [id number]         Blood Pressure from Last 3 Encounters:   08/09/18 (P) 138/76   06/26/18 142/78   04/16/18 165/84    Weight from Last 3 Encounters:   08/09/18 (P) 156 lb (70.8 kg)   06/26/18 156 lb (70.8 kg)    18 165 lb (74.8 kg)              We Performed the Following     Large Joint Injection/Arthocentesis        Primary Care Provider Office Phone # Fax #    Tra Browning -[phone]       [facility] [address]  [facility] [address]        Equal Access to Services     SHENWALI FRANDY : Hadii aad ku hadasho Soomaali, waaxda luqadaha, qaybta kaalmada adeegyada, waxay idiin hayaan adeeg loni la'aan . So [facility] [phone].    ATENCIÓN: Si habla español, tiene a walker disposición servicios gratuitos de asistencia lingüística. Marlyn al [phone].    We comply with applicable federal civil rights laws and [place] laws. We do not discriminate on the basis of race, color, national origin, age, disability, sex, sexual orientation, or gender identity.            Thank you!     Thank you for choosing [facility]  for your care. Our goal is always to provide you with excellent care. Hearing back from our patients is one way we can continue to improve our services. Please take a few minutes to complete the written survey that you may receive in the mail after your visit with us. Thank you!             Your Updated Medication List - Protect others around you: Learn how to safely use, store and throw away your medicines at www.disposemymeds.org.          This list is accurate as of 18 11:59 PM.  Always use your most recent med list.                   Brand Name Dispense Instructions for use Diagnosis    ARMOUR THYROID PO      Take 60 mg by mouth daily        CLONIDINE HCL PO      Take 0.1 mg by mouth at onset of headache  med        co-enzyme Q-10 100 MG Caps capsule      Take  by mouth daily.    Hand pain       fish oil-omega-3 fatty acids 1000 MG capsule      Take 2 g by mouth daily.    Hand pain       LEVOTHROID PO      Take 50 mcg by mouth        vitamin E 400 units Tabs      Take 400 Units by mouth daily.    Hand pain

## 2018-08-09 NOTE — PROGRESS NOTES
Alona Sosa  :  1950  DOS: 2018  MRN: [id number]    Sports Medicine Clinic Procedure    Ultrasound Guided Right Intra-Articular Knee Aspiration & Injection    Clinical History: Patient reports minimal improvement in right knee pain with her HEP over the last ~ 6 weeks.  Desiring steroid injection as previously discussed on  prior to leaving for trip to [place] next week.    Diagnosis:   1. Primary osteoarthritis of both knees    2. Chronic pain of right knee        Large Joint Injection/Arthocentesis  Date/Time: 2018 5:00 PM  Performed by: NEYAMR ALATORRE  Authorized by: NEYMAR ALATORRE     Indications:  Osteoarthritis  Guidance: ultrasound    Approach:  Superolateral  Location:  Knee  Site:  R knee joint  Medications:  40 mg triamcinolone acetonide 40 MG/ML; 3 mL ropivacaine 5 MG/ML  Outcome:  Tolerated well, no immediate complications  Procedure discussed: discussed risks, benefits, and alternatives    Consent Given by:  Patient  Prep: patient was prepped and draped in usual sterile fashion            Impression:  Successful Right intra-articular knee aspiration and injection.    Plan:  Follow up as needed based on clinical progress and duration of benefit  Expectations and goals of CSI reviewed  Often 2-3 days for steroid effect, and can take up to two weeks for maximum effect  We discussed modified progressive pain-free activity as tolerated  Do not overuse in first two weeks if feeling better due to concern for vulnerability while steroid is working  Supportive care reviewed  All questions were answered today  Contact us with additional questions or concerns  Signs and sx of concern reviewed      Neymar Alatorre DO, CAYDEN  Primary Care Sports Medicine  [facility]

## 2018-08-13 RX ORDER — TRIAMCINOLONE ACETONIDE 40 MG/ML
40 INJECTION, SUSPENSION INTRA-ARTICULAR; INTRAMUSCULAR
Status: DISCONTINUED | OUTPATIENT
Start: 2018-08-09 | End: 2019-07-18 | Stop reason: ALTCHOICE

## 2018-08-13 RX ORDER — ROPIVACAINE HYDROCHLORIDE 5 MG/ML
3 INJECTION, SOLUTION EPIDURAL; INFILTRATION; PERINEURAL
Status: DISCONTINUED | OUTPATIENT
Start: 2018-08-09 | End: 2019-07-18 | Stop reason: ALTCHOICE

## 2018-09-03 ENCOUNTER — HOSPITAL ENCOUNTER (EMERGENCY)
Facility: CLINIC | Age: 68
Discharge: HOME | End: 2018-09-03
Attending: STUDENT IN AN ORGANIZED HEALTH CARE EDUCATION/TRAINING PROGRAM | Admitting: STUDENT IN AN ORGANIZED HEALTH CARE EDUCATION/TRAINING PROGRAM
Payer: MEDICARE

## 2018-09-03 VITALS
SYSTOLIC BLOOD PRESSURE: 175 MMHG | TEMPERATURE: 97.9 F | RESPIRATION RATE: 11 BRPM | WEIGHT: 155 LBS | OXYGEN SATURATION: 96 % | HEIGHT: 66 IN | DIASTOLIC BLOOD PRESSURE: 87 MMHG | BODY MASS INDEX: 24.91 KG/M2

## 2018-09-03 DIAGNOSIS — R03.0 ELEVATED BLOOD PRESSURE READING: ICD-10-CM

## 2018-09-03 DIAGNOSIS — M54.6 THORACIC BACK PAIN, UNSPECIFIED BACK PAIN LATERALITY, UNSPECIFIED CHRONICITY: ICD-10-CM

## 2018-09-03 LAB
ALBUMIN SERPL-MCNC: 3.5 G/DL (ref 3.4–5)
ALP SERPL-CCNC: 79 U/L (ref 40–150)
ALT SERPL W P-5'-P-CCNC: 24 U/L (ref 0–50)
ANION GAP SERPL CALCULATED.3IONS-SCNC: 7 MMOL/L (ref 3–14)
AST SERPL W P-5'-P-CCNC: 24 U/L (ref 0–45)
BASOPHILS # BLD AUTO: 0.1 10E9/L (ref 0–0.2)
BASOPHILS NFR BLD AUTO: 1.4 %
BILIRUB SERPL-MCNC: 0.4 MG/DL (ref 0.2–1.3)
BUN SERPL-MCNC: 13 MG/DL (ref 7–30)
CALCIUM SERPL-MCNC: 10 MG/DL (ref 8.5–10.1)
CHLORIDE SERPL-SCNC: 98 MMOL/L (ref 94–109)
CO2 SERPL-SCNC: 30 MMOL/L (ref 20–32)
CREAT SERPL-MCNC: 0.71 MG/DL (ref 0.52–1.04)
D DIMER PPP FEU-MCNC: 0.6 UG/ML FEU (ref 0–0.5)
DIFFERENTIAL METHOD BLD: ABNORMAL
EOSINOPHIL # BLD AUTO: 0.3 10E9/L (ref 0–0.7)
EOSINOPHIL NFR BLD AUTO: 6.8 %
ERYTHROCYTE [DISTWIDTH] IN BLOOD BY AUTOMATED COUNT: 13.2 % (ref 10–15)
GFR SERPL CREATININE-BSD FRML MDRD: 82 ML/MIN/1.7M2
GLUCOSE SERPL-MCNC: 107 MG/DL (ref 70–99)
HCT VFR BLD AUTO: 35.3 % (ref 35–47)
HGB BLD-MCNC: 12.1 G/DL (ref 11.7–15.7)
LIPASE SERPL-CCNC: 121 U/L (ref 73–393)
LYMPHOCYTES # BLD AUTO: 1.3 10E9/L (ref 0.8–5.3)
LYMPHOCYTES NFR BLD AUTO: 30.1 %
MCH RBC QN AUTO: 29 PG (ref 26.5–33)
MCHC RBC AUTO-ENTMCNC: 34.3 G/DL (ref 31.5–36.5)
MCV RBC AUTO: 85 FL (ref 78–100)
MONOCYTES # BLD AUTO: 0.6 10E9/L (ref 0–1.3)
MONOCYTES NFR BLD AUTO: 13.1 %
NEUTROPHILS # BLD AUTO: 2.2 10E9/L (ref 1.6–8.3)
NEUTROPHILS NFR BLD AUTO: 48.6 %
PLATELET # BLD AUTO: 83 10E9/L (ref 150–450)
POTASSIUM SERPL-SCNC: 3.1 MMOL/L (ref 3.4–5.3)
PROT SERPL-MCNC: 7 G/DL (ref 6.8–8.8)
RBC # BLD AUTO: 4.17 10E12/L (ref 3.8–5.2)
SODIUM SERPL-SCNC: 135 MMOL/L (ref 133–144)
TROPONIN I SERPL-MCNC: <0.015 UG/L (ref 0–0.04)
WBC # BLD AUTO: 4.4 10E9/L (ref 4–11)

## 2018-09-03 PROCEDURE — 85379 FIBRIN DEGRADATION QUANT: CPT | Performed by: STUDENT IN AN ORGANIZED HEALTH CARE EDUCATION/TRAINING PROGRAM

## 2018-09-03 PROCEDURE — 84484 ASSAY OF TROPONIN QUANT: CPT | Performed by: STUDENT IN AN ORGANIZED HEALTH CARE EDUCATION/TRAINING PROGRAM

## 2018-09-03 PROCEDURE — 83690 ASSAY OF LIPASE: CPT | Performed by: STUDENT IN AN ORGANIZED HEALTH CARE EDUCATION/TRAINING PROGRAM

## 2018-09-03 PROCEDURE — 93010 ELECTROCARDIOGRAM REPORT: CPT | Mod: Z6 | Performed by: STUDENT IN AN ORGANIZED HEALTH CARE EDUCATION/TRAINING PROGRAM

## 2018-09-03 PROCEDURE — 93005 ELECTROCARDIOGRAM TRACING: CPT

## 2018-09-03 PROCEDURE — 99284 EMERGENCY DEPT VISIT MOD MDM: CPT | Mod: 25 | Performed by: STUDENT IN AN ORGANIZED HEALTH CARE EDUCATION/TRAINING PROGRAM

## 2018-09-03 PROCEDURE — 99284 EMERGENCY DEPT VISIT MOD MDM: CPT | Mod: 25

## 2018-09-03 PROCEDURE — 80053 COMPREHEN METABOLIC PANEL: CPT | Performed by: STUDENT IN AN ORGANIZED HEALTH CARE EDUCATION/TRAINING PROGRAM

## 2018-09-03 PROCEDURE — 85025 COMPLETE CBC W/AUTO DIFF WBC: CPT | Performed by: STUDENT IN AN ORGANIZED HEALTH CARE EDUCATION/TRAINING PROGRAM

## 2018-09-03 NOTE — ED AVS SNAPSHOT
[facility] Emergency Department    [address] [address]    Phone:  [phone]    Fax:  [phone]                                       Alona Sosa   MRN: [id number]    Department:  [facility] Emergency Department   Date of Visit:  9/3/2018           Patient Information     Date Of Birth          1950        Your diagnoses for this visit were:     Thoracic back pain, unspecified back pain laterality, unspecified chronicity     Elevated blood pressure reading        You were seen by Trip Styles DO.      Follow-up Information     Follow up with Tra Browning MD. Schedule an appointment as soon as possible for a visit in 3 days.    Specialty:  INTERNAL MEDICINE - ENDOCRINOLOGY, DIABETES & METABOLISM    Why:  Followup for reevaluation and managment plan.    Contact information:    [facility]  [address] [facility] [address]  [facility] [address] [phone]        Discharge References/Attachments     HIGH BLOOD PRESSURE, CONTROLLING (ENGLISH)    BACK, RELIEVING TENSION IN YOUR (ENGLISH)    GASTRITIS OR ULCER (NO ANTIBIOTIC TREATMENT) (ENGLISH)      24 Hour Appointment Hotline       To make an appointment at any [facility] clinic, call [phone] ([phone]). If you don't have a family doctor or clinic, we will help you find one. [facility] clinics are conveniently located to serve the needs of you and your family.             Review of your medicines      Our records show that you are taking the medicines listed below. If these are incorrect, please call your family doctor or clinic.        Dose / Directions Last dose taken    ARMOUR THYROID PO   Dose:  60 mg        Take 60 mg by mouth daily   Refills:  0        CLONIDINE HCL PO   Dose:  0.1 mg        Take 0.1 mg by mouth at onset of headache  med   Refills:  0        co-enzyme Q-10 100 MG Caps capsule        Take  by mouth daily.   Refills:  0        fish oil-omega-3 fatty acids 1000 MG capsule    Dose:  2 g        Take 2 g by mouth daily.   Refills:  0        LEVOTHROID PO   Dose:  50 mcg        Take 50 mcg by mouth   Refills:  0        vitamin E 400 units Tabs   Dose:  400 Units        Take 400 Units by mouth daily.   Refills:  0                Procedures and tests performed during your visit     CBC with platelets, differential    Cardiac Continuous Monitoring    Comprehensive metabolic panel    D dimer quantitative    EKG 12 lead    Lipase    Troponin I      Orders Needing Specimen Collection     None      Pending Results     No orders found from 9/1/2018 to 9/4/2018.            Pending Culture Results     No orders found from 9/1/2018 to 9/4/2018.            Pending Results Instructions     If you had any lab results that were not finalized at the time of your Discharge, you can call the ED Lab Result RN at [phone]. You will be contacted by this team for any positive Lab results or changes in treatment. The nurses are available 7 days a week from 10A to 6:30P.  You can leave a message 24 hours per day and they will return your call.        Test Results From Your Hospital Stay        9/3/2018  2:35 AM      Component Results     Component Value Ref Range & Units Status    WBC 4.4 4.0 - 11.0 10e9/L Final    RBC Count 4.17 3.8 - 5.2 10e12/L Final    Hemoglobin 12.1 11.7 - 15.7 g/dL Final    Hematocrit 35.3 35.0 - 47.0 % Final    MCV 85 78 - 100 fl Final    MCH 29.0 26.5 - 33.0 pg Final    MCHC 34.3 31.5 - 36.5 g/dL Final    RDW 13.2 10.0 - 15.0 % Final    Platelet Count 83 (L) 150 - 450 10e9/L Final    Diff Method Automated Method  Final    % Neutrophils 48.6 % Final    % Lymphocytes 30.1 % Final    % Monocytes 13.1 % Final    % Eosinophils 6.8 % Final    % Basophils 1.4 % Final    Absolute Neutrophil 2.2 1.6 - 8.3 10e9/L Final    Absolute Lymphocytes 1.3 0.8 - 5.3 10e9/L Final    Absolute Monocytes 0.6 0.0 - 1.3 10e9/L Final    Absolute Eosinophils 0.3 0.0 - 0.7 10e9/L Final    Absolute Basophils 0.1  0.0 - 0.2 10e9/L Final         9/3/2018  2:30 AM      Component Results     Component Value Ref Range & Units Status    Sodium 135 133 - 144 mmol/L Final    Potassium 3.1 (L) 3.4 - 5.3 mmol/L Final    Chloride 98 94 - 109 mmol/L Final    Carbon Dioxide 30 20 - 32 mmol/L Final    Anion Gap 7 3 - 14 mmol/L Final    Glucose 107 (H) 70 - 99 mg/dL Final    Urea Nitrogen 13 7 - 30 mg/dL Final    Creatinine 0.71 0.52 - 1.04 mg/dL Final    GFR Estimate 82 >60 mL/min/1.7m2 Final    Non  GFR Calc    GFR Estimate If Black >90 >60 mL/min/1.7m2 Final    African American GFR Calc    Calcium 10.0 8.5 - 10.1 mg/dL Final    Bilirubin Total 0.4 0.2 - 1.3 mg/dL Final    Albumin 3.5 3.4 - 5.0 g/dL Final    Protein Total 7.0 6.8 - 8.8 g/dL Final    Alkaline Phosphatase 79 40 - 150 U/L Final    ALT 24 0 - 50 U/L Final    AST 24 0 - 45 U/L Final         9/3/2018  2:27 AM      Component Results     Component Value Ref Range & Units Status    Troponin I ES <0.015 0.000 - 0.045 ug/L Final    The 99th percentile for upper reference range is 0.045 ug/L.  Troponin values   in the range of 0.045 - 0.120 ug/L may be associated with risks of adverse   clinical events.           9/3/2018  2:27 AM      Component Results     Component Value Ref Range & Units Status    Lipase 121 73 - 393 U/L Final         9/3/2018  2:58 AM      Component Results     Component Value Ref Range & Units Status    D Dimer 0.6 (H) 0.0 - 0.50 ug/ml FEU Final    This D-dimer assay is intended for use in conjunction with a clinical pretest   probability assessment model to exclude pulmonary embolism (PE) and deep   venous thrombosis (DVT) in outpatients suspected of PE or DVT. The cut-off   value is 0.5 ug/mL FEU.                  Thank you for choosing [facility]       Thank you for choosing [facility] for your care. Our goal is always to provide you with excellent care. Hearing back from our patients is one way we can continue to improve our services. Please  take a few minutes to complete the written survey that you may receive in the mail after you visit with us. Thank you!        [employer] Information     [employer] gives you secure access to your electronic health record. If you see a primary care provider, you can also send messages to your care team and make appointments. If you have questions, please call your primary care clinic.  If you do not have a primary care provider, please call [phone] and they will assist you.        Care EveryWhere ID     This is your Care EveryWhere ID. This could be used by other organizations to access your [facility] medical records  [id number]        Equal Access to Services     [facility] : Sharon Cook, allyn velasquez, anita alvarado, emily thorpe. So [facility] [phone].    ATENCIÓN: Si habla español, tiene a walker disposición servicios gratuitos de asistencia lingüística. [place] al [phone].    We comply with applicable federal civil rights laws and [place] laws. We do not discriminate on the basis of race, color, national origin, age, disability, sex, sexual orientation, or gender identity.            After Visit Summary       This is your record. Keep this with you and show to your community pharmacist(s) and doctor(s) at your next visit.

## 2018-09-03 NOTE — ED PROVIDER NOTES
"  History     Chief Complaint   Patient presents with     Back Pain     states elevated BP     HPI  Alona Sosa is a 67 year old female with past medical history which includes hypertension and hypothyroidism who presents for evaluation of elevated blood pressure readings at home.  Patient explains that shortly after 9 PM she developed an achy mid thoracic back discomfort which radiated towards her epigastrium.  The sensation was not severe but simply persistent, nonpleuritic and not associated with nausea or vomiting.  The symptoms differ from which he calls acid reflux.  She began taking her blood pressure and obtained SBP readings of 140-180 so took an extra dose of her Chinese herbal medication called \"Florencio Ju\" but blood pressure did not improve.  Her back discomfort did resolve when she got up and began moving around, no longer has active symptoms but is concerned that her blood pressure remains elevated.  She denies recent infection, chest pain, cough, shortness of breath, headache, dizziness, or neurologic symptoms.  No active complaints in the department.  Of note, patient states that she has been prescribed clonidine on an as-needed basis for elevated blood pressure readings at home but has not taken tonight    Problem List:    Patient Active Problem List    Diagnosis Date Noted     Vaccine refused by patient 10/25/2016     Priority: Medium     Patient declines flu vaccine, pneumonia vaccines, or shingles vaccine.       Essential hypertension, malignant 06/08/2015     Priority: Medium     Vitiligo 05/21/2015     Priority: Medium     Hypothyroidism 07/09/2007     Priority: Medium     Headache 10/17/2005     Priority: Medium     Problem list name updated by automated process. Provider to review          Past Medical History:    Past Medical History:   Diagnosis Date     Closed fracture of metatarsal bone 3/20/2007     Unspecified essential hypertension      Unspecified hypothyroidism        Past Surgical " "History:    No past surgical history on file.    Family History:    Family History   Problem Relation Age of Onset     Hypertension Mother      Eye Disorder Mother      Respiratory Father      TB     Cerebrovascular Disease Father      Diabetes Father      Alcohol/Drug Father      Hypertension Father      Diabetes Maternal Grandmother      Cerebrovascular Disease Maternal Grandmother      Hypertension Maternal Grandmother      Blood Disease Maternal Grandmother      Respiratory Maternal Grandfather      TB     Alcohol/Drug Maternal Grandfather      Hypertension Sister      HEART DISEASE Sister      GASTROINTESTINAL DISEASE Daughter      Neurologic Disorder Daughter      BRAIN TUMOR     Hypertension Daughter      GASTROINTESTINAL DISEASE Daughter        Social History:  Marital Status:   [2]  Social History   Substance Use Topics     Smoking status: Former Smoker     Quit date: 9/29/1975     Smokeless tobacco: Never Used     Alcohol use Yes      Comment: OCC        Medications:      ARMOUR THYROID PO   CLONIDINE HCL PO   co-enzyme Q-10 (COQ10) 100 MG CAPS   fish oil-omega-3 fatty acids (FISH OIL) 1000 MG capsule   Levothyroxine Sodium (LEVOTHROID PO)   vitamin E 400 UNIT TABS         Review of Systems  Constitutional:  Negative for fever or recent illness.  Eye:  Negative for visual changes from baseline.  Cardiovascular:  Negative for chest pain.  Respiratory:  Negative for cough or shortness of breath.  Gastrointestinal: Previous achy epigastric pain radiating from back has resolved.  Negative for nausea, vomiting, or diarrhea.  Musculoskeletal: Achy thoracic back pain resolved with ambulation.  No recent injury or trauma.  Neurological:  Negative for headache or dizziness.    All others reviewed and are negative.      Physical Exam   BP: (!) 192/97  Heart Rate: 65  Temp: 97.9  F (36.6  C)  Resp: 16  Height: 167.6 cm (5' 6\")  Weight: 70.3 kg (155 lb)  SpO2: 97 %      Physical Exam  Constitutional:  Well " developed, well nourished.  Appears nontoxic and in no acute distress.  Resting comfortably on the gurney.  HENT:  Normocephalic and atraumatic.  Symmetric in appearance.  Eyes:  Conjunctivae are normal.  Neck:  Neck supple.  Cardiovascular:  No cyanosis.  RRR.  No audible murmurs noted.  No lower extremity edema or asymmetry.   Respiratory:  Effort normal without sign of respiratory distress.  CTAB without diminished regions.    Gastrointestinal:  Soft, nondistended abdomen.  Nontender and without guarding.  No rigidity or rebound tenderness.  Negative Luque's sign.  Negative McBurney's point.  No palpable pulsatile mass.   Genitourinary:  Noncontributory.   Musculoskeletal:  Moves extremities spontaneously.  Neurological:  Patient is alert.  Skin:  Skin is warm and dry.  Psychiatric:  Normal mood and affect.      ED Course     ED Course     Procedures                 EKG Interpretation:      Interpreted by: Trip Styles  Time reviewed: Upon arrival     Symptoms at time of EKG: Asymptomatic hypertension  Rhythm: Sinus  Rate: Normal  Axis: Normal    Conduction: None atypical   ST Segments/ T Waves: No pathologic ST-elevations or T-wave abnormalities.  Q Waves: None  Comparison to prior: Similar morphology to previous     Clinical Impression: No sign of ischemia         Critical Care time:  none               Results for orders placed or performed during the hospital encounter of 09/03/18 (from the past 24 hour(s))   CBC with platelets, differential   Result Value Ref Range    WBC 4.4 4.0 - 11.0 10e9/L    RBC Count 4.17 3.8 - 5.2 10e12/L    Hemoglobin 12.1 11.7 - 15.7 g/dL    Hematocrit 35.3 35.0 - 47.0 %    MCV 85 78 - 100 fl    MCH 29.0 26.5 - 33.0 pg    MCHC 34.3 31.5 - 36.5 g/dL    RDW 13.2 10.0 - 15.0 %    Platelet Count 83 (L) 150 - 450 10e9/L    Diff Method Automated Method     % Neutrophils 48.6 %    % Lymphocytes 30.1 %    % Monocytes 13.1 %    % Eosinophils 6.8 %    % Basophils 1.4 %    Absolute  Neutrophil 2.2 1.6 - 8.3 10e9/L    Absolute Lymphocytes 1.3 0.8 - 5.3 10e9/L    Absolute Monocytes 0.6 0.0 - 1.3 10e9/L    Absolute Eosinophils 0.3 0.0 - 0.7 10e9/L    Absolute Basophils 0.1 0.0 - 0.2 10e9/L   Comprehensive metabolic panel   Result Value Ref Range    Sodium 135 133 - 144 mmol/L    Potassium 3.1 (L) 3.4 - 5.3 mmol/L    Chloride 98 94 - 109 mmol/L    Carbon Dioxide 30 20 - 32 mmol/L    Anion Gap 7 3 - 14 mmol/L    Glucose 107 (H) 70 - 99 mg/dL    Urea Nitrogen 13 7 - 30 mg/dL    Creatinine 0.71 0.52 - 1.04 mg/dL    GFR Estimate 82 >60 mL/min/1.7m2    GFR Estimate If Black >90 >60 mL/min/1.7m2    Calcium 10.0 8.5 - 10.1 mg/dL    Bilirubin Total 0.4 0.2 - 1.3 mg/dL    Albumin 3.5 3.4 - 5.0 g/dL    Protein Total 7.0 6.8 - 8.8 g/dL    Alkaline Phosphatase 79 40 - 150 U/L    ALT 24 0 - 50 U/L    AST 24 0 - 45 U/L   Troponin I   Result Value Ref Range    Troponin I ES <0.015 0.000 - 0.045 ug/L   Lipase   Result Value Ref Range    Lipase 121 73 - 393 U/L   D dimer quantitative   Result Value Ref Range    D Dimer 0.6 (H) 0.0 - 0.50 ug/ml FEU       Medications - No data to display    Assessments & Plan (with Medical Decision Making)   Alona Sosa is a 67 year old female who presents to the department who presents to the department for concerns of elevated blood pressure readings which she obtained while resting at home.  She explains that the reason she began taking her blood pressure readings was because of an achy thoracic back discomfort, fortunately that resolved prior to arrival to the department.  She denies headache, dizziness, chest discomfort, shortness of breath, or other associated symptoms.  Patient's EKG is without sign of ischemia and troponin within reference range.  Low suspicion for aortic dissection.  Lipase is also within reference range.  She admits that she had some belching earlier during these episodes which could represent gastritis or PUD.  Although the patient has remained  "asymptomatic in the department, prior to discharge she requested that pulmonary embolism be ruled out stating that she had a \"blood clot\" at the age of 24 which felt similar with back discomfort.  She is without tachypnea or hypoxia, no extremity swelling or edema, and hemodynamically stable.  Age-adjusted d-dimer is within reference range and patient reassured that there is unlikely to be a significant embolic phenomenon.  Recommend that she record her blood pressure readings over the next few days and plan to schedule follow-up appointment the primary care provider to determine whether her current medications are sufficient.      Disclaimer:  This note consists of symbols derived from keyboarding, dictation, and/or voice recognition software.  As a result, there may be errors in the script that have gone undetected.  Please consider this when interpreting information found in the chart.        I have reviewed the nursing notes.    I have reviewed the findings, diagnosis, plan and need for follow up with the patient.       New Prescriptions    No medications on file       Final diagnoses:   Thoracic back pain, unspecified back pain laterality, unspecified chronicity   Elevated blood pressure reading       9/3/2018   [facility] EMERGENCY DEPARTMENT     Trip Styles, DO  09/03/18 0314    "

## 2018-09-03 NOTE — ED AVS SNAPSHOT
[facility] Emergency Department    [address] [address]    Phone:  [phone]    Fax:  [phone]                                       Alona Sosa   MRN: [id number]    Department:  [facility] Emergency Department   Date of Visit:  9/3/2018           After Visit Summary Signature Page     I have received my discharge instructions, and my questions have been answered. I have discussed any challenges I see with this plan with the nurse or doctor.    ..........................................................................................................................................  Patient/Patient Representative Signature      ..........................................................................................................................................  Patient Representative Print Name and Relationship to Patient    ..................................................               ................................................  Date                                            Time    ..........................................................................................................................................  Reviewed by Signature/Title    ...................................................              ..............................................  Date                                                            Time          22EPIC Rev 08/18

## 2018-09-10 ENCOUNTER — HOSPITAL ENCOUNTER (OUTPATIENT)
Dept: PHYSICAL THERAPY | Facility: CLINIC | Age: 68
Setting detail: THERAPIES SERIES
End: 2018-09-10
Attending: FAMILY MEDICINE
Payer: MEDICARE

## 2018-09-10 PROCEDURE — 40000718 ZZHC STATISTIC PT DEPARTMENT ORTHO VISIT: Performed by: PHYSICAL THERAPIST

## 2018-09-10 PROCEDURE — 97110 THERAPEUTIC EXERCISES: CPT | Mod: GP | Performed by: PHYSICAL THERAPIST

## 2018-09-10 PROCEDURE — 97161 PT EVAL LOW COMPLEX 20 MIN: CPT | Mod: GP | Performed by: PHYSICAL THERAPIST

## 2018-09-10 PROCEDURE — G8979 MOBILITY GOAL STATUS: HCPCS | Mod: GP,CI | Performed by: PHYSICAL THERAPIST

## 2018-09-10 PROCEDURE — G8978 MOBILITY CURRENT STATUS: HCPCS | Mod: GP,CJ | Performed by: PHYSICAL THERAPIST

## 2018-09-10 NOTE — PROGRESS NOTES
09/10/18 1300   General Information   Type of Visit Initial OP Ortho PT Evaluation   Start of Care Date 09/10/18   Referring Physician Neymar Osman DO    Patient/Family Goals Statement To be able to do ADL's without pain   Orders Evaluate and Treat   Date of Order 06/26/18   Insurance Type Medicare;[employer]   Medical Diagnosis Chronic pain of right knee; primary osteoarthritis of both knees       Present No   Body Part(s)   Body Part(s) Knee   Presentation and Etiology   Pertinent history of current problem (include personal factors and/or comorbidities that impact the POC) Pt reports: has had right knee pain for a few years.  More stiff now after retruning from a cruise, a lot of walking.   Impairments A. Pain;E. Decreased flexibility;F. Decreased strength and endurance   Functional Limitations perform activities of daily living;perform required work activities;perform desired leisure / sports activities   Symptom Location Right medial knee   How/Where did it occur From insidious onset   Onset date of current episode/exacerbation 07/10/18   Chronicity Recurrent   Pain rating (0-10 point scale) Best (/10);Worst (/10)   Best (/10) 1   Worst (/10) 4   Pain quality A. Sharp;C. Aching   Frequency of pain/symptoms A. Constant   Pain/symptoms are: The same all the time   Current / Previous Interventions   Diagnostic Tests: X-ray   X-ray Results Results   X-ray results 6/26/18 XR knee standing: IMPRESSION: Right knee: Moderate loss of the medial joint space with marginal osteophytic spurring and subchondral sclerosis suggestive of degenerative change. Moderate joint effusion. No evidence of fracture.  Alignment appears to be within normal limits. [place] view is unremarkable.  Left knee: Mild/moderate loss of the medial femoral tibial joint space with mild subchondral sclerosis. No evidence of fracture. Sunrise view is unremarkable.   Prior Level of Function   Prior Level of Function-Mobility  Independent   Prior Level of Function-ADLs Independent   Current Level of Function   Current Community Support Family/friend caregiver   Patient role/employment history F. Retired   Living environment House/[facility]   Fall Risk Screen   Fall screen completed by PT   Have you fallen 2 or more times in the past year? No   Have you fallen and had an injury in the past year? No   Is patient a fall risk? No   Functional Scales   Functional Scales Other   Other Scales  See LEFS   Knee Objective Findings   Side (if bilateral, select both right and left) Right   Gait/Locomotion Mild roselyn Trendelenburg   Balance/Proprioception (Single Leg Stance) 5 sec   Palpation Tender with medial joint line palpation   Accessory Motion/Joint Mobility Limited patellar glide medial and lateral <1 quadrant   Right Knee Extension AROM Lacking 2 deg; Left=5 deg hyper   Right Knee Flexion AROM Rax=693 deg   Right Knee Flexion Strength 4/5   Right Knee Extension Strength 4+/5   Right Hip Abduction Strength 3+/5   Planned Therapy Interventions   Planned Therapy Interventions ADL retraining;balance training;gait training;joint mobilization;manual therapy;motor coordination training;neuromuscular re-education;ROM;strengthening;stretching   Planned Modality Interventions   Planned Modality Interventions Cryotherapy   Clinical Impression   Criteria for Skilled Therapeutic Interventions Met yes, treatment indicated   PT Diagnosis Right knee OA   Influenced by the following impairments Pain; weakness; impaired ROM; impaired proprioception   Functional limitations due to impairments Pain and difficulty walking, performing ADL's   Clinical Presentation Stable/Uncomplicated   Clinical Presentation Rationale (+) motivation; overall health   Clinical Decision Making (Complexity) Low complexity   Therapy Frequency other (see comments)   Predicted Duration of Therapy Intervention (days/wks) 1x every other week for 8 weeks   Risk & Benefits of therapy have been  explained Yes   Patient, Family & other staff in agreement with plan of care Yes   Clinical Impression Comments Alona arrives today with chronic recurrent right knee pain; she will benefit from skilled PT.   Education Assessment   Preferred Learning Style Listening;Demonstration;Pictures/video   Barriers to Learning No barriers   ORTHO GOALS   PT Ortho Eval Goals 1;2;3   Ortho Goal 1   Goal Description Patient will have >=4+/5 right hip ABD strength to reduce dynamic valgus forces across the knee.   Target Date 11/05/18   Ortho Goal 2   Goal Description Patient will be able to perform right SLS for >=15 seconds.   Target Date 11/05/18   Ortho Goal 3   Goal Description Patient will be independent with her HEP.   Target Date 10/22/18   Total Evaluation Time   Total Evaluation Time 20 min   Therapy Certification   Certification date from 09/10/18   Certification date to 11/05/18   Medical Diagnosis Chronic pain of right knee; primary osteoarthritis of both knees       Amparo Boyle, PT, DPT  Doctor of Physical Therapy #[id number]  [facility]  [phone]  [email or web address]

## 2018-09-10 NOTE — PROGRESS NOTES
[facility]          OUTPATIENT PHYSICAL THERAPY ORTHOPEDIC EVALUATION  PLAN OF TREATMENT FOR OUTPATIENT REHABILITATION  (COMPLETE FOR INITIAL CLAIMS ONLY)  Patient's Last Name, First Name, M.I.  YOB: 1950  Alona Sosa    Provider s Name:  [facility]   Medical Record No.  [id number]   Start of Care Date:  09/10/18   Onset Date:  07/10/18   Type:     _X__PT   ___OT   ___SLP Medical Diagnosis:  Chronic pain of right knee; primary osteoarthritis of both knees     PT Diagnosis:  Right knee OA   Visits from SOC:  1      _________________________________________________________________________________  Plan of Treatment/Functional Goals:  ADL retraining, balance training, gait training, joint mobilization, manual therapy, motor coordination training, neuromuscular re-education, ROM, strengthening, stretching     Cryotherapy     Goals     Goal Description: Patient will have >=4+/5 right hip ABD strength to reduce dynamic valgus forces across the knee.  Target Date: 11/05/18       Goal Description: Patient will be able to perform right SLS for >=15 seconds.  Target Date: 11/05/18       Goal Description: Patient will be independent with her HEP.  Target Date: 10/22/18            Therapy Frequency:  other (see comments)  Predicted Duration of Therapy Intervention:  1x every other week for 8 weeks    Amparo Boyle, PT    Amparo Boyle PT, DPT  Doctor of Physical Therapy #[id number]  [facility]  [phone]  [email or web address]  Thank you for this referral.                 I CERTIFY THE NEED FOR THESE SERVICES FURNISHED UNDER        THIS PLAN OF TREATMENT AND WHILE UNDER MY CARE     (Physician co-signature of this document indicates review and certification of the therapy plan).                       Certification Date From:  09/10/18   Certification Date To:  11/05/18    Referring Provider:  DO Kailash Mathis  Assessment        See Epic Evaluation Start of Care Date: 09/10/18

## 2019-05-30 ENCOUNTER — TELEPHONE (OUTPATIENT)
Dept: FAMILY MEDICINE | Facility: CLINIC | Age: 69
End: 2019-05-30

## 2019-05-30 NOTE — TELEPHONE ENCOUNTER
Reason for call:  Patient reporting a symptom    Symptom or request: Pt calling stating she had a tick embedded in her head, she pulled it out about 9 days ago, she has had some pain around it, but is unable to see if it is red or anything, pt requesting medication for this, please advise.     Duration (how long have symptoms been present): 9 days     Have you been treated for this before? No    Additional comments:     Phone Number patient can be reached at:  Cell number on file:    Telephone Information:   Mobile [phone]       Best Time:  any    Can we leave a detailed message on this number:  YES    Call taken on 5/30/2019 at 9:56 AM by Doretha Yanes

## 2019-06-04 NOTE — ADDENDUM NOTE
Encounter addended by: Amparo Boyle, PT on: 6/4/2019 10:29 AM   Actions taken: Sign clinical note, Episode resolved

## 2019-06-04 NOTE — PROGRESS NOTES
Outpatient Physical Therapy Discharge Note     Patient: Alona Sosa  : 1950    Beginning/End Dates of Reporting Period:  9/10/18 to 2019    Referring Provider: DO Bubba Mathis Diagnosis: Right knee OA     Client Self Report: Pt reports: has had right knee pain for a few years.  More stiff now after retruning from a cruise, a lot of walking.    Objective Measurements:  Objective Measure: Right knee ROM  Details: 0-2-135 deg  Objective Measure: Right hip ABD strength  Details: 3+/5         Goals:  Goal Identifier     Goal Description Patient will have >=4+/5 right hip ABD strength to reduce dynamic valgus forces across the knee.   Target Date 18   Date Met      Progress:     Goal Identifier     Goal Description Patient will be able to perform right SLS for >=15 seconds.   Target Date 18   Date Met      Progress:     Goal Identifier     Goal Description Patient will be independent with her HEP.   Target Date 10/22/18   Date Met      Progress:       Progress Toward Goals:   Progress this reporting period: Unable to assess progress; pt attended only 1 PT session.          Plan:  Discharge from therapy.    Discharge:    Reason for Discharge: Patient has failed to schedule further appointments.    Equipment Issued: Theraband    Discharge Plan: Patient to continue home program.        Amparo Boyle, PT, DPT  Doctor of Physical Therapy #[id number]  [facility]  [phone]  [email or web address]

## 2019-06-06 ENCOUNTER — OFFICE VISIT (OUTPATIENT)
Dept: FAMILY MEDICINE | Facility: CLINIC | Age: 69
End: 2019-06-06
Payer: MEDICARE

## 2019-06-06 VITALS
WEIGHT: 150 LBS | RESPIRATION RATE: 24 BRPM | TEMPERATURE: 101 F | OXYGEN SATURATION: 95 % | HEART RATE: 101 BPM | DIASTOLIC BLOOD PRESSURE: 78 MMHG | BODY MASS INDEX: 24.11 KG/M2 | HEIGHT: 66 IN | SYSTOLIC BLOOD PRESSURE: 138 MMHG

## 2019-06-06 DIAGNOSIS — J18.9 PNEUMONIA OF RIGHT LOWER LOBE DUE TO INFECTIOUS ORGANISM: Primary | ICD-10-CM

## 2019-06-06 PROCEDURE — 99214 OFFICE O/P EST MOD 30 MIN: CPT | Performed by: FAMILY MEDICINE

## 2019-06-06 RX ORDER — THYROID 15 MG/1
15 TABLET ORAL EVERY MORNING
Status: ON HOLD | COMMUNITY
Start: 2019-06-06 | End: 2020-04-01

## 2019-06-06 RX ORDER — LEVOTHYROXINE SODIUM 88 UG/1
88 TABLET ORAL DAILY
Status: ON HOLD | COMMUNITY
Start: 2019-06-06 | End: 2020-04-01

## 2019-06-06 RX ORDER — ALBUTEROL SULFATE 90 UG/1
2 AEROSOL, METERED RESPIRATORY (INHALATION) EVERY 4 HOURS PRN
Qty: 1 INHALER | Refills: 3 | Status: ON HOLD | OUTPATIENT
Start: 2019-06-06 | End: 2020-04-01

## 2019-06-06 RX ORDER — THYROID 30 MG/1
30 TABLET ORAL EVERY MORNING
COMMUNITY
Start: 2019-06-06

## 2019-06-06 ASSESSMENT — MIFFLIN-ST. JEOR: SCORE: 1217.15

## 2019-06-06 NOTE — PROGRESS NOTES
Subjective     Alona Sosa is a 68 year old female who presents to clinic today for the following health issues:    HPI   ENT Symptoms             Symptoms: cc Present Absent Comment   Fever/Chills  x  Low grade fever, chills.   Fatigue  x  Feels very weak.   Muscle Aches  x     Eye Irritation  x  Feels different.    Sneezing   x    Nasal Ghanshyam/Drg   x    Sinus Pressure/Pain  x  States she has sinus pressure with headaches.     Loss of smell   x    Dental pain   x    Sore Throat  x  Slight sore throat-maybe from the sinuses.    Swollen Glands   x    Ear Pain/Fullness   x States her left ear has been plugged. This is an ongoing issue.   Cough  x  Dry cough.  Feels like thick phlegm in the lungs.   Wheeze   x    Chest Pain  x  Chest feels tight.   Shortness of breath  x     Rash   x She is not sure if she has a rash from where her tick bite was.   Other  x  Lymph nodes for the left side of the neck were swollen on 5-17-19.  She did remove an engorged tick from her scalp.  Four days later she felt very ill and that lasted for four days.  She does still have the tick and wants to know if there is anyway to see if this could be a deer tick?  She states she has been having different headaches recently.   She has been feeling nauseated.     Symptom duration:  Started yesterday.     Symptom severity:  Little better today.     Treatments tried:  None.   Contacts:  [place] that [place]ing.          Current Outpatient Medications:      albuterol (PROAIR HFA/PROVENTIL HFA/VENTOLIN HFA) 108 (90 Base) MCG/ACT inhaler, Inhale 2 puffs into the lungs every 4 hours as needed, Disp: 1 Inhaler, Rfl: 3     amoxicillin-clavulanate (AUGMENTIN) 875-125 MG tablet, Take 1 tablet by mouth 2 times daily for 10 days, Disp: 20 tablet, Rfl: 0     co-enzyme Q-10 (COQ10) 100 MG CAPS, Take  by mouth daily., Disp: , Rfl:      fish oil-omega-3 fatty acids (FISH OIL) 1000 MG capsule, Take 2 g by mouth daily., Disp: , Rfl:       "levothyroxine (SYNTHROID/LEVOTHROID) 88 MCG tablet, Take 1 tablet (88 mcg) by mouth daily Taking this in the afternoon., Disp: , Rfl:      NEW MED, Vitamin B Complex daily., Disp: , Rfl:      thyroid (ARMOUR) 15 MG tablet, Take 1 tablet (15 mg) by mouth every morning, Disp: , Rfl:      thyroid (ARMOUR) 30 MG tablet, Take 1 tablet (30 mg) by mouth every morning, Disp: , Rfl:      vitamin E 400 UNIT TABS, Take 400 Units by mouth daily., Disp: , Rfl:     Current Facility-Administered Medications:      ropivacaine (NAROPIN) injection 3 mL, 3 mL, , , Neymar Osman DO, 3 mL at 08/09/18 1700     triamcinolone acetonide (KENALOG-40) injection 40 mg, 40 mg, , , Neymar Osman DO, 40 mg at 08/09/18 1700    Patient Active Problem List   Diagnosis     Headache     Essential hypertension, malignant     Vaccine refused by patient     Hypothyroidism     Vitiligo       Blood pressure 138/78, pulse 101, temperature 101  F (38.3  C), temperature source Tympanic, resp. rate 24, height 1.676 m (5' 6\"), weight 68 kg (150 lb), SpO2 95 %.    Exam:  GENERAL APPEARANCE: healthy, alert and no distress  EYES: EOMI,  PERRL  HENT: ear canals and TM's normal and nose and mouth without ulcers or lesions  NECK: no adenopathy, no asymmetry, masses, or scars and thyroid normal to palpation  RESP: rales bilateral and rhonchi bilaterally, more on the right  CV: regular rates and rhythm, normal S1 S2, no S3 or S4 and no murmur, click or rub -  PSYCH: mentation appears normal and affect normal/bright    (J18.1) Pneumonia of right lower lobe due to infectious organism (H)  (primary encounter diagnosis)  Comment:   Plan: amoxicillin-clavulanate (AUGMENTIN) 875-125 MG         tablet, albuterol (PROAIR HFA/PROVENTIL         HFA/VENTOLIN HFA) 108 (90 Base) MCG/ACT inhaler        We discussed the issues and will start Augmentin at 875 mg twice daily for 10 days. Use the fluids and mucus thinners, or DM cough med.   Elevate the head of the " bed. Avoid contagious exposures. This will take several weeks to resolve.     Sheng Quezada

## 2019-06-06 NOTE — PATIENT INSTRUCTIONS
Thank you for choosing [facility].  You may be receiving an email and/or telephone survey request from [facility] Customer Experience regarding your visit today.  Please take a few minutes to respond to the survey to let us know how we are doing.      If you have questions or concerns, please contact us via [employer] or you can contact your care team at [phone].    Our Clinic hours are:  Monday 6:40 am  to 7:00 pm  Tuesday -Friday 6:40 am to 5:00 pm    The [place] outpatient lab hours are:  Monday - Friday 6:10 am to 4:45 pm  Saturdays 7:00 am to 11:00 am  Appointments are required, call [phone]    If you have clinical questions after hours or would like to schedule an appointment,  call the clinic at [phone].    (J18.1) Pneumonia of right lower lobe due to infectious organism (H)  (primary encounter diagnosis)  Comment:   Plan: amoxicillin-clavulanate (AUGMENTIN) 875-125 MG         tablet, albuterol (PROAIR HFA/PROVENTIL         HFA/VENTOLIN HFA) 108 (90 Base) MCG/ACT inhaler        We discussed the issues and will start Augmentin at 875 mg twice daily for 10 days. Use the fluids and mucus thinners, or DM cough med.   Elevate the head of the bed. Avoid contagious exposures. This will take several weeks to resolve.

## 2019-07-18 ENCOUNTER — OFFICE VISIT (OUTPATIENT)
Dept: ORTHOPEDICS | Facility: CLINIC | Age: 69
End: 2019-07-18
Payer: MEDICARE

## 2019-07-18 VITALS
WEIGHT: 151 LBS | BODY MASS INDEX: 24.27 KG/M2 | HEIGHT: 66 IN | SYSTOLIC BLOOD PRESSURE: 148 MMHG | DIASTOLIC BLOOD PRESSURE: 84 MMHG

## 2019-07-18 DIAGNOSIS — G89.29 CHRONIC PAIN OF RIGHT KNEE: ICD-10-CM

## 2019-07-18 DIAGNOSIS — M17.0 PRIMARY OSTEOARTHRITIS OF BOTH KNEES: Primary | ICD-10-CM

## 2019-07-18 DIAGNOSIS — M25.561 CHRONIC PAIN OF RIGHT KNEE: ICD-10-CM

## 2019-07-18 PROCEDURE — 99214 OFFICE O/P EST MOD 30 MIN: CPT | Mod: 25 | Performed by: FAMILY MEDICINE

## 2019-07-18 PROCEDURE — 20611 DRAIN/INJ JOINT/BURSA W/US: CPT | Mod: RT | Performed by: FAMILY MEDICINE

## 2019-07-18 RX ORDER — ROPIVACAINE HYDROCHLORIDE 5 MG/ML
3 INJECTION, SOLUTION EPIDURAL; INFILTRATION; PERINEURAL
Status: DISCONTINUED | OUTPATIENT
Start: 2019-07-18 | End: 2020-04-01

## 2019-07-18 RX ORDER — TRIAMCINOLONE ACETONIDE 40 MG/ML
40 INJECTION, SUSPENSION INTRA-ARTICULAR; INTRAMUSCULAR
Status: DISCONTINUED | OUTPATIENT
Start: 2019-07-18 | End: 2020-04-01

## 2019-07-18 RX ADMIN — TRIAMCINOLONE ACETONIDE 40 MG: 40 INJECTION, SUSPENSION INTRA-ARTICULAR; INTRAMUSCULAR at 11:40

## 2019-07-18 RX ADMIN — ROPIVACAINE HYDROCHLORIDE 3 ML: 5 INJECTION, SOLUTION EPIDURAL; INFILTRATION; PERINEURAL at 11:40

## 2019-07-18 ASSESSMENT — MIFFLIN-ST. JEOR: SCORE: 1221.68

## 2019-07-18 NOTE — PROGRESS NOTES
"Alona Sosa  :  1950  DOS: 19  MRN: [id number]    Sports Medicine Clinic Visit    PCP: Tra Browning    Alona Sosa is a 67 year old female who is seen as a self referral presenting with chronic right knee pain.    Injury: Gradual onset of right knee pain over the last ~ 2 years, pain mildly improved over last ~ 2 weeks.  Pain located over right anterior, medial knee, nonradiating.  Additional Features:  Positive: grinding and weakness.  Symptoms are better with Rest and exercise program.  Symptoms are worse with: kneeling, going from sit to stand.  Other evaluation and/or treatments so far consists of: Ibuprofen, Rest and exercise program.  Recent imaging completed: No recent imaging completed.  Prior History of related problems: none    Social History: retired     Interim History - 2019  Since last visit on 18 patient has moderate right knee pain.  Right knee steroid injection completed on  provided moderate relief.  Patient notes that she fell shortly after injection landing directly on right knee and didn't note much relief following that point.  Desires trial of repeat injection today.  No new injury in the interim.    Review of Systems  Musculoskeletal: as above  Remainder of review of systems is negative including constitutional, CV, pulmonary, GI, Skin and Neurologic except as noted in HPI or medical history.    Past Medical History:   Diagnosis Date     Closed fracture of metatarsal bone 3/20/2007     Problem list name updated by automated process. Provider to review     Unspecified essential hypertension      Unspecified hypothyroidism      No past surgical history on file.    Objective  /84   Ht 1.676 m (5' 6\")   Wt 68.5 kg (151 lb)   BMI 24.37 kg/m      General: healthy, alert and in no distress    HEENT: no scleral icterus or conjunctival erythema   Skin: no suspicious lesions or rash. No jaundice.   CV: regular rhythm by palpation, 2+ distal pulses, no " pedal edema    Resp: normal respiratory effort without conversational dyspnea   Psych: normal mood and affect    Gait: minimally antalgic, appropriate coordination and balance   Neuro: normal light touch sensory exam of the extremities. Motor strength as noted below     Right Knee exam    ROM:        Flexion 140 degrees, symmetric       Extension 0 degrees       Mild pain in terminal flexion    Inspection:       no visible ecchymosis        effusion noted trace    Skin:       no visible deformities       well perfused       capillary refill brisk    Patellar Motion:        No significant lateral tilt noted in patella       Crepitus noted in the patellofemoral joint    Tender:        lateral patellar border       medial joint line    Non Tender:         remainder of knee area        medial patellar border        lateral joint line        along MCL        distal IT Band        infrapatellar tendon        tibial tubercle       pes anserine bursa    Special Tests:        Painful medial Rajesh       neg (-) Lachmans       neg (-) anterior drawer       neg (-) posterior drawer       + PF grind       neg (-) varus at 0 deg and 30 deg       neg (-) valgus at 0 deg and 30 deg    Evaluation of ipsilateral kinetic chain       normal strength with hip extension and abduction, but somewhat deconditioned on R, especially with quad activation      Radiology  XR images independently visualized and reviewed with patient today in clinic    KNEE STANDING AP BILATERAL SUNRISE BILATERAL LATERAL RIGHT   6/26/2018  8:29 AM      HISTORY: Chronic pain of right knee     COMPARISON: None.                                                         IMPRESSION:   Right knee: Moderate loss of the medial joint space with marginal  osteophytic spurring and subchondral sclerosis suggestive of  degenerative change. Moderate joint effusion. No evidence of fracture.  Alignment appears to be within normal limits. [place] view is  unremarkable.     Left  knee: Mild/moderate loss of the medial femoral tibial joint space  with mild subchondral sclerosis. No evidence of fracture. Sunrise view  is unremarkable.    Large Joint Injection/Arthocentesis: R knee joint  Date/Time: 7/18/2019 11:40 AM  Performed by: Neymar Osman DO  Authorized by: Neymar Osman DO     Indications:  Pain  Needle Size:  22 G  Guidance: ultrasound    Approach:  Superolateral  Location:  Knee      Medications:  40 mg triamcinolone 40 MG/ML; 3 mL ropivacaine 5 MG/ML  Outcome:  Tolerated well, no immediate complications  Procedure discussed: discussed risks, benefits, and alternatives    Consent Given by:  Patient  Prep: patient was prepped and draped in usual sterile fashion        Assessment:  1. Primary osteoarthritis of both knees    2. Chronic pain of right knee        Plan:  Discussed the assessment with the patient.  Follow up: prn  Discussed HEP and activity strategies  PT ordered previously to fine-tune HEP  Low impact activity reviewed  Last CSI was helpful only very temporarily due to an injury shortly after the procedure  Reviewed wt loss, activity modification and progressive increase in activity as tolerated and guided by pain  Reviewed options for potential steroid vs viscosupplementation injections and the possibility for future orthopedic referral prn  Trial of visco soon, prior approval will be initiated  Repeat CSI today  Reviewed safe and appropriate OTC medication choices, try tylenol first  Up to 3000mg daily of tylenol is generally safe, NSAID dosing and duration limitations reviewed  Discussed nature of degenerative arthrosis of the knee.   Discussed symptom treatment with ice or heat, topical treatments, and rest if needed.   Expectations and goals of CSI reviewed  Often 2-3 days for steroid effect, and can take up to two weeks for maximum effect  We discussed modified progressive pain-free activity as tolerated  Do not overuse in first two weeks if  feeling better due to concern for vulnerability while steroid is working  Supportive care reviewed  All questions were answered today  Contact us with additional questions or concerns  Signs and sx of concern reviewed      Neymar Osman DO, CAYDEN  Primary Care Sports Medicine  [facility]       Time spent in one-on-one evaluation and discussion with patient regarding nature of problem, course, prior treatments, and therapeutic options, at least 50% of which was spent in counseling and coordination of care: 25 minutes            Disclaimer: This note consists of symbols derived from keyboarding, dictation and/or voice recognition software. As a result, there may be errors in the script that have gone undetected. Please consider this when interpreting information found in this chart.

## 2019-07-18 NOTE — LETTER
2019         RE: Alona Sosa  [address]  [facility] [id number]        Dear Colleague,    Thank you for referring your patient, Alona Sosa, to the [facility]. Please see a copy of my visit note below.    Alona Sosa  :  1950  DOS: 19  MRN: [id number]    Sports Medicine Clinic Visit    PCP: Tra Browning    Alona Sosa is a 67 year old female who is seen as a self referral presenting with chronic right knee pain.    Injury: Gradual onset of right knee pain over the last ~ 2 years, pain mildly improved over last ~ 2 weeks.  Pain located over right anterior, medial knee, nonradiating.  Additional Features:  Positive: grinding and weakness.  Symptoms are better with Rest and exercise program.  Symptoms are worse with: kneeling, going from sit to stand.  Other evaluation and/or treatments so far consists of: Ibuprofen, Rest and exercise program.  Recent imaging completed: No recent imaging completed.  Prior History of related problems: none    Social History: retired     Interim History - 2019  Since last visit on 18 patient has moderate right knee pain.  Right knee steroid injection completed on  provided moderate relief.  Patient notes that she fell shortly after injection landing directly on right knee and didn't note much relief following that point.  Desires trial of repeat injection today.  No new injury in the interim.    Review of Systems  Musculoskeletal: as above  Remainder of review of systems is negative including constitutional, CV, pulmonary, GI, Skin and Neurologic except as noted in HPI or medical history.    Past Medical History:   Diagnosis Date     Closed fracture of metatarsal bone 3/20/2007     Problem list name updated by automated process. Provider to review     Unspecified essential hypertension      Unspecified hypothyroidism      No past surgical history on file.    Objective  /84   Ht  "1.676 m (5' 6\")   Wt 68.5 kg (151 lb)   BMI 24.37 kg/m       General: healthy, alert and in no distress    HEENT: no scleral icterus or conjunctival erythema   Skin: no suspicious lesions or rash. No jaundice.   CV: regular rhythm by palpation, 2+ distal pulses, no pedal edema    Resp: normal respiratory effort without conversational dyspnea   Psych: normal mood and affect    Gait: minimally antalgic, appropriate coordination and balance   Neuro: normal light touch sensory exam of the extremities. Motor strength as noted below     Right Knee exam    ROM:        Flexion 140 degrees, symmetric       Extension 0 degrees       Mild pain in terminal flexion    Inspection:       no visible ecchymosis        effusion noted trace    Skin:       no visible deformities       well perfused       capillary refill brisk    Patellar Motion:        No significant lateral tilt noted in patella       Crepitus noted in the patellofemoral joint    Tender:        lateral patellar border       medial joint line    Non Tender:         remainder of knee area        medial patellar border        lateral joint line        along MCL        distal IT Band        infrapatellar tendon        tibial tubercle       pes anserine bursa    Special Tests:        Painful medial Rajesh       neg (-) Lachmans       neg (-) anterior drawer       neg (-) posterior drawer       + PF grind       neg (-) varus at 0 deg and 30 deg       neg (-) valgus at 0 deg and 30 deg    Evaluation of ipsilateral kinetic chain       normal strength with hip extension and abduction, but somewhat deconditioned on R, especially with quad activation      Radiology  XR images independently visualized and reviewed with patient today in clinic    KNEE STANDING AP BILATERAL SUNRISE BILATERAL LATERAL RIGHT   6/26/2018  8:29 AM      HISTORY: Chronic pain of right knee     COMPARISON: None.                                                         IMPRESSION:   Right knee: Moderate " loss of the medial joint space with marginal  osteophytic spurring and subchondral sclerosis suggestive of  degenerative change. Moderate joint effusion. No evidence of fracture.  Alignment appears to be within normal limits. New [place] view is  unremarkable.     Left knee: Mild/moderate loss of the medial femoral tibial joint space  with mild subchondral sclerosis. No evidence of fracture. Sunrise view  is unremarkable.    Large Joint Injection/Arthocentesis: R knee joint  Date/Time: 7/18/2019 11:40 AM  Performed by: Neymar Osman DO  Authorized by: Neymar Osman DO     Indications:  Pain  Needle Size:  22 G  Guidance: ultrasound    Approach:  Superolateral  Location:  Knee      Medications:  40 mg triamcinolone 40 MG/ML; 3 mL ropivacaine 5 MG/ML  Outcome:  Tolerated well, no immediate complications  Procedure discussed: discussed risks, benefits, and alternatives    Consent Given by:  Patient  Prep: patient was prepped and draped in usual sterile fashion        Assessment:  1. Primary osteoarthritis of both knees    2. Chronic pain of right knee        Plan:  Discussed the assessment with the patient.  Follow up: prn  Discussed HEP and activity strategies  PT ordered previously to fine-tune HEP  Low impact activity reviewed  Last CSI was helpful only very temporarily due to an injury shortly after the procedure  Reviewed wt loss, activity modification and progressive increase in activity as tolerated and guided by pain  Reviewed options for potential steroid vs viscosupplementation injections and the possibility for future orthopedic referral prn  Trial of visco today, consider return to CSI if this is not helpful after one month  Reviewed safe and appropriate OTC medication choices, try tylenol first  Up to 3000mg daily of tylenol is generally safe, NSAID dosing and duration limitations reviewed  Discussed nature of degenerative arthrosis of the knee.   Discussed symptom treatment with ice or  heat, topical treatments, and rest if needed.   Expectations and goals of CSI reviewed  Often 2-3 days for steroid effect, and can take up to two weeks for maximum effect  We discussed modified progressive pain-free activity as tolerated  Do not overuse in first two weeks if feeling better due to concern for vulnerability while steroid is working  Supportive care reviewed  All questions were answered today  Contact us with additional questions or concerns  Signs and sx of concern reviewed      Neymar Osman DO, CALARISA  Primary Care Sports Medicine  [facility]                 Disclaimer: This note consists of symbols derived from keyboarding, dictation and/or voice recognition software. As a result, there may be errors in the script that have gone undetected. Please consider this when interpreting information found in this chart.    Again, thank you for allowing me to participate in the care of your patient.        Sincerely,        Neymar Osman DO

## 2019-08-01 ENCOUNTER — TELEPHONE (OUTPATIENT)
Dept: ORTHOPEDICS | Facility: CLINIC | Age: 69
End: 2019-08-01

## 2019-08-01 DIAGNOSIS — M17.0 PRIMARY OSTEOARTHRITIS OF BOTH KNEES: Primary | ICD-10-CM

## 2019-08-01 NOTE — TELEPHONE ENCOUNTER
Initiate PA for SynviscOne injection.  Patient completed repeat bilateral knee steroid injection on 7/18/19.  Desiring visco injections at next appointment, pending outcome of steroid injection.    Agapito Hernández, ATC

## 2020-02-08 ENCOUNTER — HEALTH MAINTENANCE LETTER (OUTPATIENT)
Age: 70
End: 2020-02-08

## 2020-04-01 ENCOUNTER — APPOINTMENT (OUTPATIENT)
Dept: ULTRASOUND IMAGING | Facility: CLINIC | Age: 70
End: 2020-04-01
Attending: FAMILY MEDICINE
Payer: COMMERCIAL

## 2020-04-01 ENCOUNTER — APPOINTMENT (OUTPATIENT)
Dept: CT IMAGING | Facility: CLINIC | Age: 70
End: 2020-04-01
Attending: EMERGENCY MEDICINE
Payer: COMMERCIAL

## 2020-04-01 ENCOUNTER — APPOINTMENT (OUTPATIENT)
Dept: MRI IMAGING | Facility: CLINIC | Age: 70
End: 2020-04-01
Attending: PHYSICIAN ASSISTANT
Payer: COMMERCIAL

## 2020-04-01 ENCOUNTER — APPOINTMENT (OUTPATIENT)
Dept: GENERAL RADIOLOGY | Facility: CLINIC | Age: 70
End: 2020-04-01
Attending: EMERGENCY MEDICINE
Payer: COMMERCIAL

## 2020-04-01 ENCOUNTER — HOSPITAL ENCOUNTER (OUTPATIENT)
Facility: CLINIC | Age: 70
Setting detail: OBSERVATION
Discharge: HOME | End: 2020-04-01
Attending: EMERGENCY MEDICINE | Admitting: FAMILY MEDICINE
Payer: COMMERCIAL

## 2020-04-01 VITALS
BODY MASS INDEX: 23.38 KG/M2 | WEIGHT: 145.5 LBS | SYSTOLIC BLOOD PRESSURE: 162 MMHG | RESPIRATION RATE: 18 BRPM | OXYGEN SATURATION: 97 % | HEIGHT: 66 IN | DIASTOLIC BLOOD PRESSURE: 72 MMHG | HEART RATE: 63 BPM | TEMPERATURE: 97.9 F

## 2020-04-01 DIAGNOSIS — R20.0 LEFT FACIAL NUMBNESS: ICD-10-CM

## 2020-04-01 DIAGNOSIS — I10 ESSENTIAL HYPERTENSION: ICD-10-CM

## 2020-04-01 DIAGNOSIS — G45.9 TIA (TRANSIENT ISCHEMIC ATTACK): ICD-10-CM

## 2020-04-01 DIAGNOSIS — E78.2 MIXED HYPERLIPIDEMIA: Primary | ICD-10-CM

## 2020-04-01 DIAGNOSIS — Z87.891 PERSONAL HISTORY OF TOBACCO USE, PRESENTING HAZARDS TO HEALTH: ICD-10-CM

## 2020-04-01 PROBLEM — D50.0 IRON DEFICIENCY ANEMIA DUE TO CHRONIC BLOOD LOSS: Status: ACTIVE | Noted: 2020-02-21

## 2020-04-01 PROBLEM — J39.2 NARROW PHARYNGEAL AIRWAY: Status: ACTIVE | Noted: 2019-06-14

## 2020-04-01 PROBLEM — D22.9 NUMEROUS MOLES: Status: ACTIVE | Noted: 2020-01-24

## 2020-04-01 PROBLEM — E55.9 VITAMIN D DEFICIENCY: Status: ACTIVE | Noted: 2019-06-14

## 2020-04-01 PROBLEM — F43.22 ADJUSTMENT DISORDER WITH ANXIOUS MOOD: Status: ACTIVE | Noted: 2020-01-24

## 2020-04-01 LAB
ALBUMIN SERPL-MCNC: 3.4 G/DL (ref 3.4–5)
ALP SERPL-CCNC: 76 U/L (ref 40–150)
ALT SERPL W P-5'-P-CCNC: 20 U/L (ref 0–50)
ANION GAP SERPL CALCULATED.3IONS-SCNC: 7 MMOL/L (ref 3–14)
APTT PPP: 28 SEC (ref 22–37)
AST SERPL W P-5'-P-CCNC: 22 U/L (ref 0–45)
BASOPHILS # BLD AUTO: 0.1 10E9/L (ref 0–0.2)
BASOPHILS NFR BLD AUTO: 1.5 %
BILIRUB SERPL-MCNC: 0.2 MG/DL (ref 0.2–1.3)
BUN SERPL-MCNC: 15 MG/DL (ref 7–30)
CALCIUM SERPL-MCNC: 8.8 MG/DL (ref 8.5–10.1)
CHLORIDE SERPL-SCNC: 107 MMOL/L (ref 94–109)
CHOLEST SERPL-MCNC: 240 MG/DL
CO2 SERPL-SCNC: 27 MMOL/L (ref 20–32)
CREAT SERPL-MCNC: 0.72 MG/DL (ref 0.52–1.04)
CRP SERPL-MCNC: 4.9 MG/L (ref 0–8)
DIFFERENTIAL METHOD BLD: ABNORMAL
EOSINOPHIL # BLD AUTO: 0.4 10E9/L (ref 0–0.7)
EOSINOPHIL NFR BLD AUTO: 7.4 %
ERYTHROCYTE [DISTWIDTH] IN BLOOD BY AUTOMATED COUNT: 22 % (ref 10–15)
ERYTHROCYTE [SEDIMENTATION RATE] IN BLOOD BY WESTERGREN METHOD: 8 MM/H (ref 0–30)
GFR SERPL CREATININE-BSD FRML MDRD: 84 ML/MIN/{1.73_M2}
GLUCOSE BLDC GLUCOMTR-MCNC: 101 MG/DL (ref 70–99)
GLUCOSE SERPL-MCNC: 98 MG/DL (ref 70–99)
HBA1C MFR BLD: 5 % (ref 0–5.6)
HCT VFR BLD AUTO: 37.9 % (ref 35–47)
HDLC SERPL-MCNC: 86 MG/DL
HGB BLD-MCNC: 11.8 G/DL (ref 11.7–15.7)
IMM GRANULOCYTES # BLD: 0 10E9/L (ref 0–0.4)
IMM GRANULOCYTES NFR BLD: 0.2 %
INR PPP: 0.91 (ref 0.86–1.14)
LDLC SERPL CALC-MCNC: 142 MG/DL
LYMPHOCYTES # BLD AUTO: 1.3 10E9/L (ref 0.8–5.3)
LYMPHOCYTES NFR BLD AUTO: 24 %
MCH RBC QN AUTO: 25.8 PG (ref 26.5–33)
MCHC RBC AUTO-ENTMCNC: 31.1 G/DL (ref 31.5–36.5)
MCV RBC AUTO: 83 FL (ref 78–100)
MONOCYTES # BLD AUTO: 0.8 10E9/L (ref 0–1.3)
MONOCYTES NFR BLD AUTO: 14.7 %
NEUTROPHILS # BLD AUTO: 2.7 10E9/L (ref 1.6–8.3)
NEUTROPHILS NFR BLD AUTO: 52.2 %
NONHDLC SERPL-MCNC: 154 MG/DL
NRBC # BLD AUTO: 0 10*3/UL
NRBC BLD AUTO-RTO: 0 /100
PLATELET # BLD AUTO: 257 10E9/L (ref 150–450)
POTASSIUM SERPL-SCNC: 3.7 MMOL/L (ref 3.4–5.3)
PROT SERPL-MCNC: 6.7 G/DL (ref 6.8–8.8)
RBC # BLD AUTO: 4.57 10E12/L (ref 3.8–5.2)
SODIUM SERPL-SCNC: 141 MMOL/L (ref 133–144)
TRIGL SERPL-MCNC: 59 MG/DL
TROPONIN I SERPL-MCNC: <0.015 UG/L (ref 0–0.04)
TSH SERPL DL<=0.005 MIU/L-ACNC: 1.16 MU/L (ref 0.4–4)
WBC # BLD AUTO: 5.3 10E9/L (ref 4–11)

## 2020-04-01 PROCEDURE — 96361 HYDRATE IV INFUSION ADD-ON: CPT

## 2020-04-01 PROCEDURE — 70544 MR ANGIOGRAPHY HEAD W/O DYE: CPT

## 2020-04-01 PROCEDURE — 84484 ASSAY OF TROPONIN QUANT: CPT | Mod: 91 | Performed by: PHYSICIAN ASSISTANT

## 2020-04-01 PROCEDURE — 99207 ZZC APP CREDIT; MD BILLING SHARED VISIT: CPT | Performed by: PHYSICIAN ASSISTANT

## 2020-04-01 PROCEDURE — 25000132 ZZH RX MED GY IP 250 OP 250 PS 637: Performed by: FAMILY MEDICINE

## 2020-04-01 PROCEDURE — 84484 ASSAY OF TROPONIN QUANT: CPT | Performed by: FAMILY MEDICINE

## 2020-04-01 PROCEDURE — 85025 COMPLETE CBC W/AUTO DIFF WBC: CPT | Performed by: EMERGENCY MEDICINE

## 2020-04-01 PROCEDURE — 85652 RBC SED RATE AUTOMATED: CPT | Performed by: PHYSICIAN ASSISTANT

## 2020-04-01 PROCEDURE — 83036 HEMOGLOBIN GLYCOSYLATED A1C: CPT | Performed by: PHYSICIAN ASSISTANT

## 2020-04-01 PROCEDURE — 70450 CT HEAD/BRAIN W/O DYE: CPT | Mod: 59

## 2020-04-01 PROCEDURE — 84443 ASSAY THYROID STIM HORMONE: CPT | Performed by: PHYSICIAN ASSISTANT

## 2020-04-01 PROCEDURE — 86140 C-REACTIVE PROTEIN: CPT | Performed by: PHYSICIAN ASSISTANT

## 2020-04-01 PROCEDURE — 36415 COLL VENOUS BLD VENIPUNCTURE: CPT | Performed by: FAMILY MEDICINE

## 2020-04-01 PROCEDURE — 25000125 ZZHC RX 250: Performed by: EMERGENCY MEDICINE

## 2020-04-01 PROCEDURE — 70553 MRI BRAIN STEM W/O & W/DYE: CPT

## 2020-04-01 PROCEDURE — 80061 LIPID PANEL: CPT | Performed by: PHYSICIAN ASSISTANT

## 2020-04-01 PROCEDURE — A9585 GADOBUTROL INJECTION: HCPCS | Performed by: FAMILY MEDICINE

## 2020-04-01 PROCEDURE — G0378 HOSPITAL OBSERVATION PER HR: HCPCS

## 2020-04-01 PROCEDURE — 99236 HOSP IP/OBS SAME DATE HI 85: CPT | Performed by: FAMILY MEDICINE

## 2020-04-01 PROCEDURE — 99285 EMERGENCY DEPT VISIT HI MDM: CPT | Mod: 25 | Performed by: EMERGENCY MEDICINE

## 2020-04-01 PROCEDURE — 85730 THROMBOPLASTIN TIME PARTIAL: CPT | Performed by: EMERGENCY MEDICINE

## 2020-04-01 PROCEDURE — 93880 EXTRACRANIAL BILAT STUDY: CPT

## 2020-04-01 PROCEDURE — 70496 CT ANGIOGRAPHY HEAD: CPT

## 2020-04-01 PROCEDURE — 25800030 ZZH RX IP 258 OP 636: Performed by: EMERGENCY MEDICINE

## 2020-04-01 PROCEDURE — 25500064 ZZH RX 255 OP 636: Performed by: FAMILY MEDICINE

## 2020-04-01 PROCEDURE — 25800030 ZZH RX IP 258 OP 636: Performed by: FAMILY MEDICINE

## 2020-04-01 PROCEDURE — 80053 COMPREHEN METABOLIC PANEL: CPT | Performed by: EMERGENCY MEDICINE

## 2020-04-01 PROCEDURE — 93010 ELECTROCARDIOGRAM REPORT: CPT | Mod: Z6 | Performed by: EMERGENCY MEDICINE

## 2020-04-01 PROCEDURE — 36415 COLL VENOUS BLD VENIPUNCTURE: CPT | Performed by: PHYSICIAN ASSISTANT

## 2020-04-01 PROCEDURE — 25000132 ZZH RX MED GY IP 250 OP 250 PS 637: Performed by: PHYSICIAN ASSISTANT

## 2020-04-01 PROCEDURE — 70549 MR ANGIOGRAPH NECK W/O&W/DYE: CPT

## 2020-04-01 PROCEDURE — 85610 PROTHROMBIN TIME: CPT | Performed by: EMERGENCY MEDICINE

## 2020-04-01 PROCEDURE — 25000128 H RX IP 250 OP 636: Performed by: EMERGENCY MEDICINE

## 2020-04-01 PROCEDURE — 84484 ASSAY OF TROPONIN QUANT: CPT | Performed by: EMERGENCY MEDICINE

## 2020-04-01 PROCEDURE — 86140 C-REACTIVE PROTEIN: CPT | Performed by: EMERGENCY MEDICINE

## 2020-04-01 PROCEDURE — 71046 X-RAY EXAM CHEST 2 VIEWS: CPT

## 2020-04-01 PROCEDURE — 96360 HYDRATION IV INFUSION INIT: CPT | Mod: 59 | Performed by: EMERGENCY MEDICINE

## 2020-04-01 PROCEDURE — 00000146 ZZHCL STATISTIC GLUCOSE BY METER IP

## 2020-04-01 PROCEDURE — 93005 ELECTROCARDIOGRAM TRACING: CPT | Performed by: EMERGENCY MEDICINE

## 2020-04-01 RX ORDER — SPIRONOLACTONE 25 MG
1 TABLET ORAL DAILY
COMMUNITY

## 2020-04-01 RX ORDER — LEVOTHYROXINE SODIUM 100 UG/1
100 TABLET ORAL DAILY
Status: DISCONTINUED | OUTPATIENT
Start: 2020-04-01 | End: 2020-04-01 | Stop reason: HOSPADM

## 2020-04-01 RX ORDER — THYROID 15 MG/1
30 TABLET ORAL DAILY
Status: DISCONTINUED | OUTPATIENT
Start: 2020-04-01 | End: 2020-04-01 | Stop reason: HOSPADM

## 2020-04-01 RX ORDER — LEVOTHYROXINE SODIUM 100 UG/1
100 TABLET ORAL
COMMUNITY
Start: 2020-03-11

## 2020-04-01 RX ORDER — LORAZEPAM 0.5 MG/1
0.25 TABLET ORAL
Status: COMPLETED | OUTPATIENT
Start: 2020-04-01 | End: 2020-04-01

## 2020-04-01 RX ORDER — LOVASTATIN 40 MG
40 TABLET ORAL AT BEDTIME
Qty: 30 TABLET | Refills: 0 | Status: SHIPPED | OUTPATIENT
Start: 2020-04-01

## 2020-04-01 RX ORDER — SODIUM CHLORIDE 9 MG/ML
60 INJECTION, SOLUTION INTRAVENOUS ONCE
Status: COMPLETED | OUTPATIENT
Start: 2020-04-01 | End: 2020-04-01

## 2020-04-01 RX ORDER — POTASSIUM CHLORIDE 750 MG/1
2 TABLET, EXTENDED RELEASE ORAL 3 TIMES DAILY
COMMUNITY
Start: 2020-01-27

## 2020-04-01 RX ORDER — MULTIVITAMIN WITH IRON
1 TABLET ORAL DAILY
COMMUNITY

## 2020-04-01 RX ORDER — GADOBUTROL 604.72 MG/ML
7 INJECTION INTRAVENOUS ONCE
Status: COMPLETED | OUTPATIENT
Start: 2020-04-01 | End: 2020-04-01

## 2020-04-01 RX ORDER — THYROID 30 MG/1
30 TABLET ORAL EVERY MORNING
Status: DISCONTINUED | OUTPATIENT
Start: 2020-04-01 | End: 2020-04-01 | Stop reason: CLARIF

## 2020-04-01 RX ORDER — POTASSIUM CHLORIDE 1500 MG/1
20 TABLET, EXTENDED RELEASE ORAL 3 TIMES DAILY
Status: DISCONTINUED | OUTPATIENT
Start: 2020-04-01 | End: 2020-04-01 | Stop reason: HOSPADM

## 2020-04-01 RX ORDER — LISINOPRIL 10 MG/1
10 TABLET ORAL DAILY
Qty: 30 TABLET | Refills: 3 | Status: SHIPPED | OUTPATIENT
Start: 2020-04-01

## 2020-04-01 RX ORDER — MULTIVIT WITH MINERALS/LUTEIN
1000 TABLET ORAL DAILY
COMMUNITY

## 2020-04-01 RX ORDER — ACETAMINOPHEN 325 MG/1
650 TABLET ORAL EVERY 4 HOURS PRN
Status: DISCONTINUED | OUTPATIENT
Start: 2020-04-01 | End: 2020-04-01 | Stop reason: HOSPADM

## 2020-04-01 RX ORDER — LEVOTHYROXINE SODIUM 88 UG/1
88 TABLET ORAL
Status: DISCONTINUED | OUTPATIENT
Start: 2020-04-01 | End: 2020-04-01

## 2020-04-01 RX ORDER — IOPAMIDOL 755 MG/ML
70 INJECTION, SOLUTION INTRAVASCULAR ONCE
Status: COMPLETED | OUTPATIENT
Start: 2020-04-01 | End: 2020-04-01

## 2020-04-01 RX ORDER — THYROID 30 MG/1
30 TABLET ORAL DAILY
Status: DISCONTINUED | OUTPATIENT
Start: 2020-04-01 | End: 2020-04-01

## 2020-04-01 RX ORDER — ASCORBIC ACID 125 MG
1 TABLET,CHEWABLE ORAL DAILY
COMMUNITY

## 2020-04-01 RX ORDER — LORAZEPAM 2 MG/ML
0.25 INJECTION INTRAMUSCULAR
Status: COMPLETED | OUTPATIENT
Start: 2020-04-01 | End: 2020-04-01

## 2020-04-01 RX ADMIN — SODIUM CHLORIDE 60 ML: 9 INJECTION, SOLUTION INTRAVENOUS at 11:00

## 2020-04-01 RX ADMIN — SODIUM CHLORIDE 50 ML: 9 INJECTION, SOLUTION INTRAVENOUS at 10:47

## 2020-04-01 RX ADMIN — SODIUM CHLORIDE 100 ML: 9 INJECTION, SOLUTION INTRAVENOUS at 03:01

## 2020-04-01 RX ADMIN — SODIUM CHLORIDE 500 ML: 9 INJECTION, SOLUTION INTRAVENOUS at 03:16

## 2020-04-01 RX ADMIN — IOPAMIDOL 70 ML: 755 INJECTION, SOLUTION INTRAVENOUS at 03:01

## 2020-04-01 RX ADMIN — LORAZEPAM 0.25 MG: 0.5 TABLET ORAL at 10:32

## 2020-04-01 RX ADMIN — GADOBUTROL 7 ML: 604.72 INJECTION INTRAVENOUS at 10:48

## 2020-04-01 RX ADMIN — POTASSIUM CHLORIDE 20 MEQ: 20 TABLET, EXTENDED RELEASE ORAL at 12:43

## 2020-04-01 RX ADMIN — THYROID, PORCINE 30 MG: 15 TABLET ORAL at 12:48

## 2020-04-01 ASSESSMENT — ENCOUNTER SYMPTOMS
CONFUSION: 0
LIGHT-HEADEDNESS: 0
VOMITING: 0
CHEST TIGHTNESS: 0
NAUSEA: 0
SEIZURES: 0
CHILLS: 0
NUMBNESS: 1
NECK PAIN: 0
HEADACHES: 0
NECK STIFFNESS: 0
MYALGIAS: 0
APPETITE CHANGE: 0
WEAKNESS: 0
SHORTNESS OF BREATH: 0
SPEECH DIFFICULTY: 0
ABDOMINAL PAIN: 0
FEVER: 0
BACK PAIN: 0
FATIGUE: 0

## 2020-04-01 ASSESSMENT — MIFFLIN-ST. JEOR
SCORE: 1212.15
SCORE: 1191.75

## 2020-04-01 ASSESSMENT — COLUMBIA-SUICIDE SEVERITY RATING SCALE - C-SSRS
2. HAVE YOU ACTUALLY HAD ANY THOUGHTS OF KILLING YOURSELF IN THE PAST MONTH?: NO
1. IN THE PAST MONTH, HAVE YOU WISHED YOU WERE DEAD OR WISHED YOU COULD GO TO SLEEP AND NOT WAKE UP?: NO

## 2020-04-01 NOTE — DISCHARGE SUMMARY
[facility] Discharge Summary    Alona Sosa MRN# [id number]   Age: 69 year old YOB: 1950     Date of Admission:  4/1/2020  Date of Discharge::  4/1/2020  Admitting Physician:  Aj Roth MD  Discharge Physician:  Rashid Carrillo MD, MD             Admission Diagnoses:   TIA (transient ischemic attack) [G45.9]  Essential hypertension [I10]  Left facial numbness [R20.0]          Principle Discharge Diagnosis:       Transient Left Facial Tingling and Jaw Pain - unclear etiology but does not look like TIA, MI or other ominous etiology     See hospital course for further active diagnoses addressed during this admission.            Procedures:   See EMR for imaging results.            Medications Prior to Admission:     Facility-Administered Medications Prior to Admission   Medication Dose Route Frequency Provider Last Rate Last Dose     [DISCONTINUED] ropivacaine (NAROPIN) injection 3 mL  3 mL   Neymar Osman DO   3 mL at 07/18/19 1140     [DISCONTINUED] triamcinolone (KENALOG-40) injection 40 mg  40 mg   Neymar Osman DO   40 mg at 07/18/19 1140     Medications Prior to Admission   Medication Sig Dispense Refill Last Dose     alpha-lipoic acid 100 MG capsule Take 100 mg by mouth 2 times daily   3/31/2020 at am     co-enzyme Q-10 (COQ10) 100 MG CAPS Take  by mouth daily.   3/31/2020 at Unknown time     fish oil-omega-3 fatty acids (FISH OIL) 1000 MG capsule Take 2 g by mouth daily.   3/31/2020 at Unknown time     levothyroxine (SYNTHROID/LEVOTHROID) 100 MCG tablet Take 100 mcg by mouth daily before breakfast   3/31/2020 at AM     Lutein 20 MG CAPS Take 1 capsule by mouth daily   3/31/2020 at Unknown time     Menaquinone-7 (VITAMIN K2) 100 MCG CAPS Take 1 tablet by mouth daily   3/31/2020 at Unknown time     potassium chloride ER (KLOR-CON M) 10 MEQ CR tablet Take 2 tablets by mouth 3 times daily   3/31/2020 at am, middday     thyroid (ARMOUR) 30 MG tablet Take 1  tablet (30 mg) by mouth every morning   3/31/2020 at AM     vitamin (B COMPLEX-C) tablet Take 1 tablet by mouth daily   3/31/2020 at Unknown time     vitamin C (ASCORBIC ACID) 1000 MG TABS Take 1,000 mg by mouth daily   3/31/2020 at Unknown time     vitamin E 400 UNIT TABS Take 400 Units by mouth daily.   3/31/2020 at Unknown time             Discharge Medications:     Current Discharge Medication List      START taking these medications    Details   aspirin (ASA) 81 MG tablet Take 1 tablet (81 mg) by mouth daily  Qty: 30 tablet, Refills: 3    Associated Diagnoses: Mixed hyperlipidemia      lisinopril (ZESTRIL) 10 MG tablet Take 1 tablet (10 mg) by mouth daily  Qty: 30 tablet, Refills: 3    Associated Diagnoses: Essential hypertension      lovastatin (MEVACOR) 40 MG tablet Take 1 tablet (40 mg) by mouth At Bedtime  Qty: 30 tablet, Refills: 0    Associated Diagnoses: Mixed hyperlipidemia         CONTINUE these medications which have NOT CHANGED    Details   alpha-lipoic acid 100 MG capsule Take 100 mg by mouth 2 times daily      co-enzyme Q-10 (COQ10) 100 MG CAPS Take  by mouth daily.    Associated Diagnoses: Hand pain      fish oil-omega-3 fatty acids (FISH OIL) 1000 MG capsule Take 2 g by mouth daily.    Associated Diagnoses: Hand pain      levothyroxine (SYNTHROID/LEVOTHROID) 100 MCG tablet Take 100 mcg by mouth daily before breakfast      Lutein 20 MG CAPS Take 1 capsule by mouth daily      Menaquinone-7 (VITAMIN K2) 100 MCG CAPS Take 1 tablet by mouth daily      potassium chloride ER (KLOR-CON M) 10 MEQ CR tablet Take 2 tablets by mouth 3 times daily      thyroid (ARMOUR) 30 MG tablet Take 1 tablet (30 mg) by mouth every morning      vitamin (B COMPLEX-C) tablet Take 1 tablet by mouth daily      vitamin C (ASCORBIC ACID) 1000 MG TABS Take 1,000 mg by mouth daily      vitamin E 400 UNIT TABS Take 400 Units by mouth daily.    Associated Diagnoses: Hand pain                   Consultations:   Phone consultation  "with vascular surgery from [facility]          Brief History of Illness:     From Admission H+P:   Alona Sosa is a 69 year old female who presents following an episode of facial tingling/pain.     Last night she was sleeping and awoke to a strange sensation in the left side of her face around midnight described as a \"pressure\", did not localize. She then developed a numbness/tingling sensation around her left eye, and left upper/lower lip. She nexted developed a \"deep pain\" in her jaw that she could not describe further but stated it was severe and radiated toward her eye and neck. She took her blood pressure and noted it was elevated around SBP of 189. She developed nausea, a flushed sensation and had an urgent bowel movement. She took a clonidine around 2 AM which helped these associated symptoms but did not change her facial symptoms. She did not have any chest pressure/pain, palpitations, shortness of breath, diaphoresis or dizziness during the event. She presented to the ED with ongoing symptoms and they resolved completely around 4:30 AM. She denied any changes in vision/hearing/speech/swallowing, weakness, incoordination or dizziness.      She reports she has episodes like this but is unable to describe them fully, the facial symptoms are new. She states she would often wake up following a nightmare with elevations in blood pressure and other associated symptoms. Sometimes the elevations in blood pressure and associated symptoms would happen outside of sleeping or any sort of stress/anxiety. She again, is unable to fully describe her associated symptoms, stating she has it written down but not with her. She was able to reduce the frequency to once every 4-5 months with Chinese medicine but stopped ordering these with concerns of COVID. She has started to take a different supplement \"Xtra HTN\" 3 times a day about a month ago. Since starting that she has had a few episodes. She was on Cozaar for her blood " "pressure, however this did not change the frequency in her blood pressures.     She believes she may have some sort of parasite or h.pylori that is causing this. Her h.pylori test was negative 4 years ago but she believes she still may have this. She previously had epigastric pain but now has intestinal pain. She does have an appointment for an endoscopy and colonoscopy in a week to further evaluate.      Patient denies fever, chills, cough, congestion, sore throat, shortness of breath, palpitations, chest pain, abdominal pain, diarrhea, changes in urination, rash or wounds.               TODAY:     Subjective:  Has done well, remains completely symptom free since admission and at baseline.  No chest pain, pressure heaviness or tightness.  No fever or chills.  No dyspnea.  No neck or facial symptoms today.    No other pain.       ROS:   ROS: 10 point ROS neg other than the symptoms noted above in the HPI.   BP (!) 162/72   Pulse 63   Temp 97.9  F (36.6  C) (Oral)   Resp 18   Ht 1.676 m (5' 6\")   Wt 66 kg (145 lb 8.1 oz)   SpO2 97%   BMI 23.48 kg/m     EXAM:   GENERAL APPEARANCE ADULT: Alert, no acute distress, oriented x 3  EYES: PERRL, EOM normal, conjunctiva and lids normal, EOM normal  HENT: oral mucous membranes moist, head atraumatic and normocephalic  NECK: No adenopathy,masses or thyromegaly, supple  CV: regular rate and rhythm no murmur  Pulm: clear to auscultation bilaterally  Abdomen: soft, non-tender, no masses, no masses, normal bowel sounds.  MS: extremities normal, no peripheral edema  SKIN: no suspicious lesions or rashes  NEURO: Alert, oriented, speech and mentation normal, Cranial nerves 2-12 are normal., Strength normal and symmetrical in upper and lower extremities.  Sensation to light touch intact throughout upper and lower extremities bilaterally.   Reflexes 2+ and symmetrical at biceps, triceps, knees and ankles. Finger to nose and heel to shin testing is normal.  PSYCH: mentation " "appears normal, affect and mood normal          Hospital Course:     Alona Sosa is a 69 year old female admitted on 4/1/2020. She has history of hypertension, hyperlipidemia, hypothyroidism. She presents with left sided facial tingling.     Transient Left Facial Tingling and Jaw Pain - unclear etiology but does not look like TIA, MI or other ominous etiology   4 hours of facial tingling near the left eye, and left upper/lower lip and jaw pain. Associated nausea, urgent bowel movement, and flushed feeling which resolved with clonidine. Neurological exam non-focal. Head CT reveals no acute findings. CTA shows moderate stenosis at left P2 and \"Left supraclinoid ICA demonstrates a small 2-3 mm inferior outpouching may reflect infundibulum with poorly visualized apical artery or small aneurysm\". Hemoglobin A1c 5.0. Lipids mildly elevated at total 240, . Troponin negative. ED discussed with Stroke Neurology consult who recommended MR and MRA head/neck for further evaluation. Differential includes cartidynia  vs TIA vs less likely anginal equivalent vs TMJ syndrome vs hypertensive urgency vs GCA vs atypical migraine vs trigeminal neuralgia vs anxiety vs other.  - MR Brain, MRA head and neck showed Small penetrating atherosclerotic ulcer versus small dissection involving the left distal common carotid artery unchanged from CT.    Symptoms resolved on admission and have not recurred.  Does not look like anginal symptoms especially with normal ECG, non-exertional symptoms and troponins negative x 3, I don't think she needs stress testing at this point unless she has ongoing/other symptoms as an outpatient.    - nothing for TIA/stroke on imaging and overall I don't think this looks like a TIA.  Work-up overall has been negative other than atherosclerotic disease as below, so I suspect acute symptoms are due to anxiety but we need to work on blood pressure and cholesterol as below.       Atherosclerotic disease left " "carotid artery   Radiology read as possible atherosclerotic ulcer vs dissection, but discussed in detail with vascular surgery on call for Meenakshi who reviewed the images along with a collegue who both agreed that they thought this did not look like a ulceration or dissection and that even if there was a small ulceration they would not intervene.  Recommended just working on blood pressure control - starting lisinopril 10 mg daily and plan for follow-up with primary care provider likely at virtual visit in 1 week, patient to monitor blood pressure at home.  They also recommended starting lovastatin 40 mg daily and aspirin 81 mg daily.  Repeat imaging (MRA vs carotid US) in 6 months.      Possible small intracranial aneurysm   MRA brain showed:  Incidental 2 to 3 mm outpouching near the expected origin of the left posterior communicating artery. Evaluation is limited due to motion artifact. This area was poorly characterized on the prior MRA due to motion artifact. This could represent aneurysm or infundibulum.  Recommend follow-up imaging in 6 months.      Hypertension  Blood pressures reviewed, elevated in the ED. Outpatient values reviewed, mildly elevated at 138/86 in last clinic encounter. Other most recent outpatient values 120/60, 136/70, 128/74, reasonably controlled. Recently stopped chinese medicine and started new herbal medicine \"Xtra HTN\" (listed ingredients: african snake root, motherwort, hawthorn leaf, passion flower, Euro mistletoe).   - discussed importance of improving her blood pressure control and recommended lisinopril as above- sounds like patient is amenable to this but still considering using her herbals as well or instead- will discuss this with primary care provider but strongly encouraged to try the lisinopril in the meantime.       Hyperlipidemia  As above, strongly recommended trying lovastatin as prescribed, patient agreeable to trying this and seeing how she does with it.  Follow-up " "with primary care provider.       Hypothyroidism  TSH normal- continue home levothyroxine     Incidental pulmonary nodule  Seen incidentally on CT, CXR shows \"Calcified granuloma in the left lower lung superior segment \". Appears that this was evident on 2007 outside CT exam per review of report.  - no follow up needed      Diet: Low Saturated Fat Na <2400 mg      DVT Prophylaxis: Low Risk/Ambulatory with no VTE prophylaxis indicated    Mahmood Catheter: not present    Code Status: Full code, discussed on admit          Discharge Instructions and Follow-Up:     Discharge diet: Orders Placed This Encounter      Low Saturated Fat Na <2400 mg       Discharge activity: Activity as tolerated   Discharge follow-up: Follow up with primary care provider in 7 days, likely as virtual visit            Discharge Disposition:     Discharged to home      Attestation:  I have reviewed today's vital signs, notes, medications, labs and imaging.  Amount of time performed on this discharge summary and H+P 90 minutes.    Rashid Carrillo MD, MD       "

## 2020-04-01 NOTE — PLAN OF CARE
"[facility] [place]    Patient admitted to room 2400 at approximately 0445 via cart from emergency room. Patient was accompanied by transport tech.     Verbal SBAR report received from FRANCISCO Monaco prior to patient arrival.     Patient ambulated to bed with stand-by assist. Patient alert and oriented X 3. The patient is not having any pain. 0-10 Pain Scale: 0. Admission vital signs: Blood pressure (!) 170/60, pulse 62, temperature 98.1  F (36.7  C), temperature source Oral, resp. rate 16, height 1.676 m (5' 6\"), weight 66 kg (145 lb 8.1 oz), SpO2 95 %. Patient were oriented to plan of care, call light, bed controls, tv, telephone, bathroom, and visiting hours.     Risk Assessment    The following safety risks were identified during admission: none. Yellow risk band applied: NO.     Skin Initial Assessment    This writer admitted this patient and a Keanu score in the Adult PCS flowsheet. Appropriate interventions initiated as needed.     Secondary skin check completed by n/a - Pt refused.         Education    Patient has a [place] to Observation order: Yes  Observation education completed and documented: Yes      Brad Johnson RN on 4/1/2020 at 6:37 AM      Observation Brochure and Video    Patient informed of observation status based on provider's order.  Observation brochure was given. Patient stated understanding. Questions answered.  Brad Johnson RN        "

## 2020-04-01 NOTE — ED TRIAGE NOTES
"Negative numb/tingling arms/legs  Left sided face tingling- awoke feeling like a pounding feeling in her face  Left eye feels numb  Denies HA  Denies pain  Numbness to left side of lips  Jaw hurts  Awoke at 1230 am with feeling of pounding in her face-  BP 190s sat home    BP meds- does not want to take regular medicine.  Checks her BP \"all the time\".  It \"bounces all over\"  Transitioning off \"Chinese medicine\" Xenchu to Extra Strength HTM  "

## 2020-04-01 NOTE — ED PROVIDER NOTES
"  History     Chief Complaint   Patient presents with     Numbness     Left sided facial     HPI  Alona Sosa is a 69 year old female with history of hypertension and hypothyroidism presenting for evaluation of elevated blood pressure and left-sided facial numbness.  Patient reports going to bed feeling well but woke around 12:30 AM and noticed numbness in her face.  Patient reports \"I know when my blood pressure is high and I felt like my blood pressure was high tonight\" so she checked her blood pressure.  She reports the systolic blood pressure was in the 180s.  Patient noticed left-sided facial numbness and when it did not resolve, decided to come in.  Patient denies any other neurologic symptoms including no headache, vision changes, peripheral numbness, tingling, or weakness.  Denies any difficulty with speaking or swallowing.  Denies history of previous stroke.    Allergies:  Allergies   Allergen Reactions     Macrobid [Nitrofurantoin Anhydrous] Swelling       Problem List:    Patient Active Problem List    Diagnosis Date Noted     Left facial numbness 04/01/2020     Priority: Medium     Vaccine refused by patient 10/25/2016     Priority: Medium     Patient declines flu vaccine, pneumonia vaccines, or shingles vaccine.       Essential hypertension, malignant 06/08/2015     Priority: Medium     Vitiligo 05/21/2015     Priority: Medium     Hypothyroidism 07/09/2007     Priority: Medium     Headache 10/17/2005     Priority: Medium     Problem list name updated by automated process. Provider to review          Past Medical History:    Past Medical History:   Diagnosis Date     Closed fracture of metatarsal bone 3/20/2007     Unspecified essential hypertension      Unspecified hypothyroidism        Past Surgical History:    No past surgical history on file.    Family History:    Family History   Problem Relation Age of Onset     Hypertension Mother      Eye Disorder Mother      Respiratory Father         TB     " "Cerebrovascular Disease Father      Diabetes Father      Alcohol/Drug Father      Hypertension Father      Diabetes Maternal Grandmother      Cerebrovascular Disease Maternal Grandmother      Hypertension Maternal Grandmother      Blood Disease Maternal Grandmother      Respiratory Maternal Grandfather         TB     Alcohol/Drug Maternal Grandfather      Hypertension Sister      Heart Disease Sister      Gastrointestinal Disease Daughter      Neurologic Disorder Daughter         BRAIN TUMOR     Hypertension Daughter      Gastrointestinal Disease Daughter        Social History:  Marital Status:   [2]  Social History     Tobacco Use     Smoking status: Former Smoker     Last attempt to quit: 1975     Years since quittin.5     Smokeless tobacco: Never Used   Substance Use Topics     Alcohol use: Yes     Comment: OCC     Drug use: No        Medications:    No current outpatient medications on file.        Review of Systems   Constitutional: Negative for appetite change, chills, fatigue and fever.   HENT: Negative for congestion.    Eyes: Negative for visual disturbance.   Respiratory: Negative for chest tightness and shortness of breath.    Cardiovascular: Negative for chest pain.   Gastrointestinal: Negative for abdominal pain, nausea and vomiting.   Musculoskeletal: Negative for back pain, myalgias, neck pain and neck stiffness.   Skin: Negative for rash.   Neurological: Positive for numbness. Negative for seizures, speech difficulty, weakness, light-headedness and headaches.   Psychiatric/Behavioral: Negative for confusion.   All other systems reviewed and are negative.      Physical Exam   BP: (!) 182/90  Pulse: 61  Heart Rate: 67  Temp: 98.1  F (36.7  C)  Resp: 16  Height: 167.6 cm (5' 6\")  Weight: 68 kg (150 lb)  SpO2: 96 %      Physical Exam  Vitals signs and nursing note reviewed.   Constitutional:       Appearance: Normal appearance. She is not ill-appearing or diaphoretic.   HENT:      Head: " Normocephalic and atraumatic.      Nose: Nose normal.      Mouth/Throat:      Mouth: Mucous membranes are moist.   Eyes:      Conjunctiva/sclera: Conjunctivae normal.   Cardiovascular:      Rate and Rhythm: Normal rate.      Pulses: Normal pulses.      Heart sounds: Normal heart sounds.   Pulmonary:      Effort: Pulmonary effort is normal.      Breath sounds: Normal breath sounds.   Abdominal:      Palpations: Abdomen is soft.      Tenderness: There is no abdominal tenderness.   Skin:     General: Skin is warm and dry.      Capillary Refill: Capillary refill takes less than 2 seconds.   Neurological:      Mental Status: She is alert and oriented to person, place, and time.      Sensory: Sensory deficit (left face) present.      Motor: No weakness.      Coordination: Coordination normal.      Gait: Gait normal.   Psychiatric:         Mood and Affect: Mood normal.         ED Course        Procedures               EKG Interpretation:      Interpreted by Shahab Ramírez MD  Time reviewed: 0319  Symptoms at time of EKG: left facial numbness   Rhythm: sinus bradycardia  Rate: 58  Axis: Normal  Ectopy: none  Conduction: normal  ST Segments/ T Waves: No acute ischemic changes  Q Waves: none  Comparison to prior: Not significantly changed compared to previous EKG 9/3/2018    Clinical Impression: Sinus bradycardia without acute abnormality        The patient has stroke symptoms:           ED Stroke specific documentation           NIHSS PDF          Protocol PDF     Patient last known well time: 2200  ED Provider first to bedside at: upon arrival  CT Results received at: 0322    tPA:   Not given due to minor / isolated / quickly resolving stroke symptoms.    If treating with tPA: Ensure SBP<185 and DBP<105 prior to treatment with IV tPA.  Administering IV tPA after treatment with IV labetalol, hydralazine, or nicardipine is reasonable once BP control is established.    Endovascular Retrieval:  Not initiated due to  absence of proximal vessel occlusion    [employer] Stroke Scale (Baseline)  Time Performed: upon arrival      Score    Level of consciousness: (0)   Alert, keenly responsive    LOC questions: (0)   Answers both questions correctly    LOC commands: (0)   Performs both tasks correctly    Best gaze: (0)   Normal    Visual: (0)   No visual loss    Facial palsy: (1)   Minor paralysis (flat nasolabial fold, smile asymmetry)    Motor arm (left): (0)   No drift    Motor arm (right): (0)   No drift    Motor leg (left): (0)   No drift    Motor leg (right): (0)   No drift    Limb ataxia: (0)   Absent    Sensory: (0)   Normal- no sensory loss    Best language: (0)   Normal- no aphasia    Dysarthria: (0)   Normal    Extinction and inattention: (0)   No abnormality        Total Score:  1        Stroke Mimics were considered (including migraine headache, seizure disorder, hypoglycemia (or hyperglycemia), head or spinal trauma, CNS infection, Toxin ingestion and shock state (e.g. sepsis) .                         Results for orders placed or performed during the hospital encounter of 04/01/20 (from the past 24 hour(s))   Glucose by meter   Result Value Ref Range    Glucose 101 (H) 70 - 99 mg/dL   CBC with platelets differential   Result Value Ref Range    WBC 5.3 4.0 - 11.0 10e9/L    RBC Count 4.57 3.8 - 5.2 10e12/L    Hemoglobin 11.8 11.7 - 15.7 g/dL    Hematocrit 37.9 35.0 - 47.0 %    MCV 83 78 - 100 fl    MCH 25.8 (L) 26.5 - 33.0 pg    MCHC 31.1 (L) 31.5 - 36.5 g/dL    RDW 22.0 (H) 10.0 - 15.0 %    Platelet Count 257 150 - 450 10e9/L    Diff Method Automated Method     % Neutrophils 52.2 %    % Lymphocytes 24.0 %    % Monocytes 14.7 %    % Eosinophils 7.4 %    % Basophils 1.5 %    % Immature Granulocytes 0.2 %    Nucleated RBCs 0 0 /100    Absolute Neutrophil 2.7 1.6 - 8.3 10e9/L    Absolute Lymphocytes 1.3 0.8 - 5.3 10e9/L    Absolute Monocytes 0.8 0.0 - 1.3 10e9/L    Absolute Eosinophils 0.4 0.0 - 0.7 10e9/L     Absolute Basophils 0.1 0.0 - 0.2 10e9/L    Abs Immature Granulocytes 0.0 0 - 0.4 10e9/L    Absolute Nucleated RBC 0.0    INR   Result Value Ref Range    INR 0.91 0.86 - 1.14   Partial thromboplastin time   Result Value Ref Range    PTT 28 22 - 37 sec   Troponin I   Result Value Ref Range    Troponin I ES <0.015 0.000 - 0.045 ug/L   Comprehensive metabolic panel   Result Value Ref Range    Sodium 141 133 - 144 mmol/L    Potassium 3.7 3.4 - 5.3 mmol/L    Chloride 107 94 - 109 mmol/L    Carbon Dioxide 27 20 - 32 mmol/L    Anion Gap 7 3 - 14 mmol/L    Glucose 98 70 - 99 mg/dL    Urea Nitrogen 15 7 - 30 mg/dL    Creatinine 0.72 0.52 - 1.04 mg/dL    GFR Estimate 84 >60 mL/min/[1.73_m2]    GFR Estimate If Black >90 >60 mL/min/[1.73_m2]    Calcium 8.8 8.5 - 10.1 mg/dL    Bilirubin Total 0.2 0.2 - 1.3 mg/dL    Albumin 3.4 3.4 - 5.0 g/dL    Protein Total 6.7 (L) 6.8 - 8.8 g/dL    Alkaline Phosphatase 76 40 - 150 U/L    ALT 20 0 - 50 U/L    AST 22 0 - 45 U/L   CT Head w/o Contrast    Narrative    EXAM: CT HEAD W/O CONTRAST  LOCATION: [facility]  DATE/TIME: 4/1/2020 3:00 AM    INDICATION: Left facial numbness  COMPARISON: None.  TECHNIQUE: Routine without IV contrast. Multiplanar reformats. Dose reduction techniques were used.    FINDINGS:  INTRACRANIAL CONTENTS:  Bilateral occipital lobe cortex demonstrates symmetric small areas of hypoattenuation. Symmetry favors artifact. Please correlate clinically.     Otherwise, cortical ribbon is intact. Otherwise, no CT evidence of acute infarct.     Aspects score 10.     No acute intracranial hemorrhage, extraaxial collection, or mass effect.  Otherwise, unremarkable parenchymal attenuation. Normal ventricles and sulci.     VISUALIZED ORBITS/SINUSES/MASTOIDS: No intraorbital abnormality. No paranasal sinus mucosal disease. No middle ear or mastoid effusion.    BONES/SOFT TISSUES:   Left mid chest possible small pulmonary nodule on  view. Recommend chest x-ray for  confirmation.        Impression    IMPRESSION:  1.  Bilateral occipital lobe cortex demonstrates small areas of hypoattenuation. Symmetry favors artifact. Please correlate clinically.   2.  Otherwise, cortical ribbon is intact. Otherwise, no CT evidence of acute infarct.   3.  Aspects score 10.  4.  No acute intracranial hemorrhage.  5.  Left mid chest possible small pulmonary nodule on  view. Recommend chest x-ray for confirmation.  6.  Additional findings above.    Dr Shahab Ramírez was notified by Dr Zan Rai at 3:20 AM 1 2020.   CTA Head Neck with Contrast    Narrative    EXAM: CTA  HEAD NECK WITH CONTRAST  LOCATION: [facility]  DATE/TIME: 4/1/2020 3:00 AM    INDICATION: Left facial numbness  COMPARISON: None.  CONTRAST: 70 mL Isovue 370  TECHNIQUE: Head and neck CT angiogram with IV contrast. Axial helical CT images of the head and neck vessels obtained during the arterial phase of intravenous contrast administration. Axial 2D reconstructed images and multiplanar 3D MIP reconstructed   images of the head and neck vessels were performed by the technologist. Dose reduction techniques were used. All stenosis measurements made according to NASCET criteria unless otherwise specified.    FINDINGS:   HEAD CTA:  ANTERIOR CIRCULATION:   No significant stenosis/occlusion.    Left supraclinoid ICA demonstrates a small 2-3 mm inferior outpouching may reflect infundibulum with poorly visualized apical artery or small aneurysm.     Otherwise, no visualized cerebral aneurysm.    Codominant right P1 segment and right posterior communicating artery.    POSTERIOR CIRCULATION:   Left proximal P2 segment moderate stenosis.    Less, no significant stenosis/occlusion. No visualized cerebral aneurysm.      DURAL VENOUS SINUSES: Expected enhancement of the major dural venous sinuses.      NECK CTA:  RIGHT CAROTID:  No significant stenosis/occlusion.    LEFT CAROTID:  Left distal common carotid artery  demonstrates a web, small focal dissection measuring 3 mm, or prominent ulceration.     No significant stenosis/occlusion.    VERTEBRAL ARTERIES: No significant stenosis/occlusion. Relatively balanced vertebral arteries.    AORTIC ARCH: Vascular variant aortic arch origin left vertebral artery.  No significant stenosis at the origin of the great vessels.     ARTERIAL PLAQUE: Calcified/noncalcified plaque within the aorta, bilateral extracranial carotid arteries (including but not limited to the carotid bifurcations and carotid bulbs), bilateral carotid siphons.    Otherwise, no cervical artery dissection.    NONVASCULAR STRUCTURES:   Degenerative changes noted within the spine.    Dental amalgam resulting in streak artifact.      Impression    IMPRESSION:   HEAD CTA:   1.  Left supraclinoid ICA demonstrates a small 2-3 mm inferior outpouching may reflect infundibulum with poorly visualized apical artery or small aneurysm.   2.  Left proximal P2 segment moderate stenosis.  3.  Remaining proximal intracranial arteries demonstrate no significant stenosis or occlusion.   4.  No intracranial arterial large vessel occlusion.      NECK CTA:  1.  Left distal common carotid artery demonstrates a web, small focal dissection measuring 3 mm, or prominent ulceration.  2.  Cervical arteries demonstrate no significant stenosis or occlusion.  3.  Additional findings above.    Dr Shahab Ramírez was notified by Dr Zan Rai at 3:40 AM 1 2020.   Chest XR,  PA & LAT    Narrative    EXAM: XR CHEST 2 VW  LOCATION: [facility]  DATE/TIME: 4/1/2020 3:41 AM    INDICATION: Possible pulmonary nodule on neck CT  COMPARISON: CT head and neck 04/01/2020      Impression    IMPRESSION: Calcified granuloma in the left lower lung superior segment correlates with the finding on comparison topogram. Trace right pleural effusion versus pleural thickening. No lung consolidation. Minimal increased opacity in the lower chest  "  bilaterally on PA view likely due to overlying soft tissues of the chest wall/breast. No pneumothorax. Normal heart size and mediastinal contours.       Medications   acetaminophen (TYLENOL) tablet 650 mg (has no administration in time range)   iopamidol (ISOVUE-370) solution 70 mL (70 mLs Intravenous Given 4/1/20 0301)   sodium chloride 0.9 % bag 500mL for CT scan flush use (100 mLs Intravenous Given 4/1/20 0301)   0.9% sodium chloride BOLUS (0 mLs Intravenous Stopped 4/1/20 0427)     Patient Vitals for the past 24 hrs:   BP Temp Temp src Pulse Heart Rate Resp SpO2 Height Weight   04/01/20 0453 (!) 170/60 98.1  F (36.7  C) Oral 62 -- 16 95 % 1.676 m (5' 6\") 66 kg (145 lb 8.1 oz)   04/01/20 0415 (!) 155/80 -- -- 54 -- -- 93 % -- --   04/01/20 0400 (!) 162/80 -- -- 57 -- -- 96 % -- --   04/01/20 0330 (!) 155/79 -- -- 59 -- -- 93 % -- --   04/01/20 0315 (!) 159/85 -- -- 62 -- -- 96 % -- --   04/01/20 0300 (!) 157/94 -- -- 61 -- -- 97 % -- --   04/01/20 0248 (!) 182/90 -- -- -- -- -- -- 1.676 m (5' 6\") 68 kg (150 lb)   04/01/20 0245 -- -- -- -- 67 16 96 % -- --         2:58 AM: Discussed with Dr Zheng, neurology.  Given patient mild symptoms and improving symptoms, no indication for TPA and unlikely to be a large vessel occlusion so not amenable to endovascular therapy.    3:15 AM; Discussed with Dr Zheng. CT/CTA without major abnormality. Ok to de-escalate stroke code and get MRI in AM.     3:22 AM; Discussed with radiology: Dr Roche. CT: no hemorrhage. Possible pulmonary nodule. Cxr recommended.     3:45 AM: Discussed with radiology again re: CTA. No acute infarct. Left distal common carotid artery abnormality - may require followup. Paged neuro to discuss.     3:51 AM: Discussed with Dr Zheng. Recommends MRA neck to re-visualize along with MRI.     4:15 AM: Patient reassessed.  Numbness now resolved.  Will admit patient for observation with plan for MRI/MRA later today.    5:35 AM; Paged for admission. Discussed " with Dr Roth. Accepts for obs.    Assessments & Plan (with Medical Decision Making)  69-year-old female with history of hypertension reportedly controlled by homeopathic medications presenting for evaluation of acute onset of left facial numbness and hypertension.  Patient alert and oriented with only minor deficit upon arrival with subjective decrease sensation of the left face and possible mild facial weakness of the left face.  Symptoms reportedly improving upon arrival in the ED but still present.  Stroke work-up with CT negative for evidence of acute stroke.  CTA did show a mild left common carotid abnormality and neurology recommended performing an MRA along with MRI to evaluate for possible stroke.  Admitted to the medicine service for further work-up of presumed TIA.  Blood pressure spontaneously improved without intervention and is not critically high requiring any acute pharmacologic intervention.     I have reviewed the nursing notes.    I have reviewed the findings, diagnosis, plan and need for follow up with the patient.       Current Discharge Medication List          Final diagnoses:   Left facial numbness   TIA (transient ischemic attack)   Essential hypertension       4/1/2020   [facility] EMERGENCY DEPARTMENT            Ramírez, Shahab Reece MD  04/01/20 3312

## 2020-04-01 NOTE — PLAN OF CARE
[place] [facility] DISCHARGE NOTE    Patient discharged to home at 6:07 PM via ambulation. Accompanied by other:staff and staff. Discharge instructions reviewed with patient, opportunity offered to ask questions. Prescriptions sent to patients preferred pharmacy. All belongings sent with patient.    Jaci Mckeon RN

## 2020-04-01 NOTE — H&P
"[facility]    History and Physical - Hospitalist Service       Date of Admission:  4/1/2020    Assessment & Plan   Alona Sosa is a 69 year old female admitted on 4/1/2020. She has history of hypertension, hyperlipidemia, hypothyroidism. She presents with left sided facial tingling.    Transient Left Facial Tingling and Jaw Pain  4 hours of facial tingling near the left eye, and left upper/lower lip and jaw pain. Associated nausea, urgent bowel movement, and flushed feeling which resolved with clonidine. Neurological exam non-focal. Head CT reveals no acute findings. CTA shows moderate stenosis at left P2 and \"Left supraclinoid ICA demonstrates a small 2-3 mm inferior outpouching may reflect infundibulum with poorly visualized apical artery or small aneurysm\". Hemoglobin A1c 5.0. Lipids mildly elevated at total 240, . Troponin negative. ED discussed with Stroke Neurology consult who recommended MR and MRA head/neck for further evaluation. Differential includes cartidynia  vs TIA vs less likely anginal equivalent vs TMJ syndrome vs hypertensive urgency vs GCA vs atypical migraine vs trigeminal neuralgia vs anxiety vs other.  - MR Brain, MRA head and neck  - telemetry, consider Zio Patch monitor at discharge  - order ECHO with bubble  - neuro checks Q4  - add on ESR/CRP  - serial troponin  - plan for blood pressure control following MR     Hypertension  Blood pressures reviewed, elevated in the ED. Outpatient values reviewed, mildly elevated at 138/86 in last clinic encounter. Other most recent outpatient values 120/60, 136/70, 128/74, reasonably controlled. Recently stopped chinese medicine and started new herbal medicine \"Xtra HTN\" (listed ingredients: african snake root, motherwort, hawthorn leaf, passion flower, Euro mistletoe).   - continue to monitor, may need to initiate antihypertensive pending values  - advised patient not to take new supplement though patient wishes to " "take, ok to take per patient preference     Hyperlipidemia  Diet controlled, mildly elevated on last check (, total 249).   - lipid panel ordered    Hypothyroidism  Chronic.   - continue home levothyroxine    Incidental pulmonary nodule  Seen incidentally on CT, CXR shows \"Calcified granuloma in the left lower lung superior segment \". Appears that this was evident on 2007 outside CT exam per review of report.  - no follow up needed      Diet: Low Saturated Fat Na <2400 mg    DVT Prophylaxis: Low Risk/Ambulatory with no VTE prophylaxis indicated  Mahmood Catheter: not present  Code Status: Full code, discussed on admit    Disposition Plan   Expected discharge: Today, recommended to prior living arrangement once work up complete with no concerning findings barring discharge.  Entered: Lolly Hannah PA-C 04/01/2020, 8:30 AM     The patient's care was discussed with the Attending Physician, Dr. Rashid Carrillo and Patient.    Lolly Hannah PA-C  [facility]  ______________________________________________________________________    Chief Complaint   Facial tingling and pressure    History is obtained from the patient    History of Present Illness   Alona Sosa is a 69 year old female who presents following an episode of facial tingling/pain.    Last night she was sleeping and awoke to a strange sensation in the left side of her face around midnight described as a \"pressure\", did not localize. She then developed a numbness/tingling sensation around her left eye, and left upper/lower lip. She nexted developed a \"deep pain\" in her jaw that she could not describe further but stated it was severe and radiated toward her eye and neck. She took her blood pressure and noted it was elevated around SBP of 189. She developed nausea, a flushed sensation and had an urgent bowel movement. She took a clonidine around 2 AM which helped these associated symptoms but did not change her facial " "symptoms. She did not have any chest pressure/pain, palpitations, shortness of breath, diaphoresis or dizziness during the event. She presented to the ED with ongoing symptoms and they resolved completely around 4:30 AM. She denied any changes in vision/hearing/speech/swallowing, weakness, incoordination or dizziness.     She reports she has episodes like this but is unable to describe them fully, the facial symptoms are new. She states she would often wake up following a nightmare with elevations in blood pressure and other associated symptoms. Sometimes the elevations in blood pressure and associated symptoms would happen outside of sleeping or any sort of stress/anxiety. She again, is unable to fully describe her associated symptoms, stating she has it written down but not with her. She was able to reduce the frequency to once every 4-5 months with Chinese medicine but stopped ordering these with concerns of COVID. She has started to take a different supplement \"Xtra HTN\" 3 times a day about a month ago. Since starting that she has had a few episodes. She was on Cozaar for her blood pressure, however this did not change the frequency in her blood pressures.    She believes she may have some sort of parasite or h.pylori that is causing this. Her h.pylori test was negative 4 years ago but she believes she still may have this. She previously had epigastric pain but now has intestinal pain. She does have an appointment for an endoscopy and colonoscopy in a week to further evaluate.     Patient denies fever, chills, cough, congestion, sore throat, shortness of breath, palpitations, chest pain, abdominal pain, diarrhea, changes in urination, rash or wounds.    Review of Systems    The 10 point Review of Systems is negative other than noted in the HPI or here.     Past Medical History    I have reviewed this patient's medical history and updated it with pertinent information if needed.   Past Medical History: "   Diagnosis Date     Closed fracture of metatarsal bone 3/20/2007     Problem list name updated by automated process. Provider to review     Unspecified essential hypertension      Unspecified hypothyroidism      Past Surgical History   I have reviewed this patient's surgical history and updated it with pertinent information if needed.  History reviewed. No pertinent surgical history.    Social History   I have reviewed this patient's social history and updated it with pertinent information if needed.  Social History     Tobacco Use     Smoking status: Former Smoker     Last attempt to quit: 1975     Years since quittin.5     Smokeless tobacco: Never Used   Substance Use Topics     Alcohol use: Yes     Comment: OCC     Drug use: No     Family History   I have reviewed this patient's family history and updated it with pertinent information if needed.   Family History   Problem Relation Age of Onset     Hypertension Mother      Eye Disorder Mother      Respiratory Father         TB     Cerebrovascular Disease Father      Diabetes Father      Alcohol/Drug Father      Hypertension Father      Diabetes Maternal Grandmother      Cerebrovascular Disease Maternal Grandmother      Hypertension Maternal Grandmother      Blood Disease Maternal Grandmother      Respiratory Maternal Grandfather         TB     Alcohol/Drug Maternal Grandfather      Hypertension Sister      Heart Disease Sister      Gastrointestinal Disease Daughter      Neurologic Disorder Daughter         BRAIN TUMOR     Hypertension Daughter      Gastrointestinal Disease Daughter      Prior to Admission Medications   Prior to Admission Medications   Prescriptions Last Dose Informant Patient Reported? Taking?   NEW MED 3/31/2020 at Unknown time  Yes Yes   Sig: Vitamin B Complex daily.   albuterol (PROAIR HFA/PROVENTIL HFA/VENTOLIN HFA) 108 (90 Base) MCG/ACT inhaler Unknown at Unknown time  No No   Sig: Inhale 2 puffs into the lungs every 4 hours as  needed   co-enzyme Q-10 (COQ10) 100 MG CAPS 3/31/2020 at Unknown time Self Yes Yes   Sig: Take  by mouth daily.   fish oil-omega-3 fatty acids (FISH OIL) 1000 MG capsule 3/31/2020 at Unknown time Self Yes Yes   Sig: Take 2 g by mouth daily.   levothyroxine (SYNTHROID/LEVOTHROID) 88 MCG tablet 3/31/2020 at Unknown time  Yes Yes   Sig: Take 1 tablet (88 mcg) by mouth daily Taking this in the afternoon.   thyroid (ARMOUR) 30 MG tablet 3/31/2020 at Unknown time  Yes Yes   Sig: Take 1 tablet (30 mg) by mouth every morning   vitamin E 400 UNIT TABS 3/31/2020 at Unknown time Self Yes Yes   Sig: Take 400 Units by mouth daily.      Facility-Administered Medications Last Administration Doses Remaining   ropivacaine (NAROPIN) injection 3 mL 7/18/2019 11:40 AM    triamcinolone (KENALOG-40) injection 40 mg 7/18/2019 11:40 AM         Allergies   Allergies   Allergen Reactions     Macrobid [Nitrofurantoin Anhydrous] Swelling       Physical Exam   Vital Signs: Temp: 98.1  F (36.7  C) Temp src: Oral BP: (!) 170/60 Pulse: 62 Heart Rate: 56 Resp: 16 SpO2: 95 % O2 Device: None (Room air)    Weight: 145 lbs 8.06 oz    Constitutional: sitting up comfortably in bed, awake, alert, cooperative, no apparent distress, and appears stated age  Eyes: Lids and lashes normal, pupils equal, round and reactive to light, extra ocular muscles intact, sclera clear, conjunctiva normal  ENT: Normocephalic, without obvious abnormality, atraumatic, oral pharynx with moist mucous membranes.  Hematologic / Lymphatic: no cervical lymphadenopathy and no supraclavicular lymphadenopathy  Respiratory: good air exchange, clear to auscultation bilaterally, no crackles or wheezing  Cardiovascular: Regular rate and rhythm, and no murmur noted. No lower extremity edema.  GI: normal bowel sounds, soft, non-distended, non-tender  Genitounirinary: deferred  Skin: normal skin color, texture, turgor  Musculoskeletal: Normal bulk and tone. Moves all 4 extremities  appropriately.  Neurologic: Awake, alert, oriented to name, place and time.  Cranial nerves II-XII are grossly intact. Strength is 5/5 bilaterally in upper and lower extremities. Sensation to light touch is grossly intact. Normal rapid alternating movement of upper extremities.  Neuropsychiatric: calm, pleasant, cooperative, appropriate thought process/content, short term memory intact.    Data   Data reviewed today: I reviewed all medications, new labs and imaging results over the last 24 hours. I personally reviewed no images or EKG's today.    Recent Labs   Lab 04/01/20  0252   WBC 5.3   HGB 11.8   MCV 83      INR 0.91      POTASSIUM 3.7   CHLORIDE 107   CO2 27   BUN 15   CR 0.72   ANIONGAP 7   MALIKA 8.8   GLC 98   ALBUMIN 3.4   PROTTOTAL 6.7*   BILITOTAL 0.2   ALKPHOS 76   ALT 20   AST 22   TROPI <0.015     Recent Results (from the past 24 hour(s))   CT Head w/o Contrast    Narrative    EXAM: CT HEAD W/O CONTRAST  LOCATION: [facility]  DATE/TIME: 4/1/2020 3:00 AM    INDICATION: Left facial numbness  COMPARISON: None.  TECHNIQUE: Routine without IV contrast. Multiplanar reformats. Dose reduction techniques were used.    FINDINGS:  INTRACRANIAL CONTENTS:  Bilateral occipital lobe cortex demonstrates symmetric small areas of hypoattenuation. Symmetry favors artifact. Please correlate clinically.     Otherwise, cortical ribbon is intact. Otherwise, no CT evidence of acute infarct.     Aspects score 10.     No acute intracranial hemorrhage, extraaxial collection, or mass effect.  Otherwise, unremarkable parenchymal attenuation. Normal ventricles and sulci.     VISUALIZED ORBITS/SINUSES/MASTOIDS: No intraorbital abnormality. No paranasal sinus mucosal disease. No middle ear or mastoid effusion.    BONES/SOFT TISSUES:   Left mid chest possible small pulmonary nodule on  view. Recommend chest x-ray for confirmation.        Impression    IMPRESSION:  1.  Bilateral occipital lobe cortex  demonstrates small areas of hypoattenuation. Symmetry favors artifact. Please correlate clinically.   2.  Otherwise, cortical ribbon is intact. Otherwise, no CT evidence of acute infarct.   3.  Aspects score 10.  4.  No acute intracranial hemorrhage.  5.  Left mid chest possible small pulmonary nodule on  view. Recommend chest x-ray for confirmation.  6.  Additional findings above.    Dr Shahab Ramírez was notified by Dr Zan Rai at 3:20 AM 1 2020.   CTA Head Neck with Contrast    Narrative    EXAM: CTA  HEAD NECK WITH CONTRAST  LOCATION: [facility]  DATE/TIME: 4/1/2020 3:00 AM    INDICATION: Left facial numbness  COMPARISON: None.  CONTRAST: 70 mL Isovue 370  TECHNIQUE: Head and neck CT angiogram with IV contrast. Axial helical CT images of the head and neck vessels obtained during the arterial phase of intravenous contrast administration. Axial 2D reconstructed images and multiplanar 3D MIP reconstructed   images of the head and neck vessels were performed by the technologist. Dose reduction techniques were used. All stenosis measurements made according to NASCET criteria unless otherwise specified.    FINDINGS:   HEAD CTA:  ANTERIOR CIRCULATION:   No significant stenosis/occlusion.    Left supraclinoid ICA demonstrates a small 2-3 mm inferior outpouching may reflect infundibulum with poorly visualized apical artery or small aneurysm.     Otherwise, no visualized cerebral aneurysm.    Codominant right P1 segment and right posterior communicating artery.    POSTERIOR CIRCULATION:   Left proximal P2 segment moderate stenosis.    Less, no significant stenosis/occlusion. No visualized cerebral aneurysm.      DURAL VENOUS SINUSES: Expected enhancement of the major dural venous sinuses.      NECK CTA:  RIGHT CAROTID:  No significant stenosis/occlusion.    LEFT CAROTID:  Left distal common carotid artery demonstrates a web, small focal dissection measuring 3 mm, or prominent ulceration.     No  significant stenosis/occlusion.    VERTEBRAL ARTERIES: No significant stenosis/occlusion. Relatively balanced vertebral arteries.    AORTIC ARCH: Vascular variant aortic arch origin left vertebral artery.  No significant stenosis at the origin of the great vessels.     ARTERIAL PLAQUE: Calcified/noncalcified plaque within the aorta, bilateral extracranial carotid arteries (including but not limited to the carotid bifurcations and carotid bulbs), bilateral carotid siphons.    Otherwise, no cervical artery dissection.    NONVASCULAR STRUCTURES:   Degenerative changes noted within the spine.    Dental amalgam resulting in streak artifact.      Impression    IMPRESSION:   HEAD CTA:   1.  Left supraclinoid ICA demonstrates a small 2-3 mm inferior outpouching may reflect infundibulum with poorly visualized apical artery or small aneurysm.   2.  Left proximal P2 segment moderate stenosis.  3.  Remaining proximal intracranial arteries demonstrate no significant stenosis or occlusion.   4.  No intracranial arterial large vessel occlusion.      NECK CTA:  1.  Left distal common carotid artery demonstrates a web, small focal dissection measuring 3 mm, or prominent ulceration.  2.  Cervical arteries demonstrate no significant stenosis or occlusion.  3.  Additional findings above.    Dr Shahab Ramírez was notified by Dr Zan Rai at 3:40 AM 1 2020.   Chest XR,  PA & LAT    Narrative    EXAM: XR CHEST 2 VW  LOCATION: [facility]  DATE/TIME: 4/1/2020 3:41 AM    INDICATION: Possible pulmonary nodule on neck CT  COMPARISON: CT head and neck 04/01/2020      Impression    IMPRESSION: Calcified granuloma in the left lower lung superior segment correlates with the finding on comparison topogram. Trace right pleural effusion versus pleural thickening. No lung consolidation. Minimal increased opacity in the lower chest   bilaterally on PA view likely due to overlying soft tissues of the chest wall/breast. No  pneumothorax. Normal heart size and mediastinal contours.

## 2020-04-01 NOTE — PROGRESS NOTES
[facility]    Stroke Telephone Note    I was called by Dr. Shahab Ramírez on 04/01/20 at 0256 regarding patient Alona Sosa. The patient is a 69 year old female with uncontrolled hypertension who presented with numbness on left side of face and maybe a slight facial asymmetry.    Stroke Code Data  (for stroke code without tele)  Stroke code activated 04/01/20   0252   First stroke provider response   04/01/20   0256   Last known normal 03/31/20       Time of discovery   (or onset of symptoms) 04/01/20   0030   Head CT read by me 04/01/20   0305   Was stroke code de-escalated? Yes 04/01/20    other (see comments) Low NIHSS, no LVO, nondebilitating symptom     TPA Treatment   Not given due to minor / isolated / quickly resolving stroke symptoms.    Endovascular Treatment  Not initiated due to absence of proximal vessel occlusion    Impression  Left facial tingling    Recommendations  - MRI Brain to rule out stroke in the morning    My recommendations are based on the information provided on the phone by Dr. Shahab Ramírez.    Cathy Chand MD   Neurocritical Care    Text Page ([phone])

## 2020-04-01 NOTE — PHARMACY
Medication list updated via phone protocol with patient.  List has been updated.    Patient is taking 100 mcg Levothyroxine daily with 30 mg Spencer Thyroid. Patient is also on Potassium 20 meq three times daily.  Along with various vitamins and mineral supplements.    Medication Reconciliation Completed.

## 2020-04-02 ENCOUNTER — TELEPHONE (OUTPATIENT)
Dept: FAMILY MEDICINE | Facility: CLINIC | Age: 70
End: 2020-04-02

## 2020-04-02 NOTE — TELEPHONE ENCOUNTER
ED/[facility]/IP follow up phone call:Left Facial Numbness, Mixed Hyperlipidemia    RN please call to follow up. [place] 4/1/2020    Number of ED visits in past 12 months = 0    Jasmyne Quintana on 4/2/2020 at 10:35 AM

## 2020-04-06 RX ORDER — LOSARTAN POTASSIUM 50 MG/1
50 TABLET ORAL 2 TIMES DAILY
COMMUNITY

## 2020-04-06 NOTE — TELEPHONE ENCOUNTER
"  ED for acute condition Discharge Protocol    \"Hi, my name is Lilli Rizo RN, a registered nurse, and I am calling from [facility].  I am calling to follow up and see how things are going for you after your recent emergency visit.\"    Tell me how you are doing now that you are home?\" no more numbess in face, blood pressure seems to stay elevate but spoke with specialist in [place]-Dr. Gerard Swartz      Discharge Instructions    \"Let's review your discharge instructions.  What is/are the follow-up recommendations?  Pt. Response: Yes done today    \"Has an appointment with your primary care provider been scheduled?\"  Yes. (confirm and remind to bring meds)    Medications    \"Tell me what changed about your medicines when you discharged?\"    Added Cozaar today 50 MG BID    \"What questions do you have about your medications?\"   None        Call Summary    \"What questions or concerns do you have about your recent visit and your follow-up care?\"     none    \"If you have questions or things don't continue to improve, we encourage you contact us through the main clinic number (give number).  Even if the clinic is not open, triage nurses are available 24/7 to help you.     We would like you to know that our clinic has extended hours (provide information).  We also have urgent care (provide details on closest location and hours/contact info)\"    \"Thank you for your time and take care!\"         FREDY Gamble, RN      "

## 2020-04-06 NOTE — TELEPHONE ENCOUNTER
"ED / Discharge Outreach Protocol    Patient Contact    Attempt # 1    Was call answered?  Yes.  \"May I please speak with <patient name>\"  Is patient available?   No.  Spoke with male who answered to have her call us back,   Francheska Brown RNC    "

## 2020-11-08 ENCOUNTER — HEALTH MAINTENANCE LETTER (OUTPATIENT)
Age: 70
End: 2020-11-08

## 2021-02-14 ENCOUNTER — NURSE TRIAGE (OUTPATIENT)
Dept: NURSING | Facility: CLINIC | Age: 71
End: 2021-02-14

## 2021-02-14 NOTE — TELEPHONE ENCOUNTER
Woke up with horrible nightmares   Heart was pounding and so she was not sure if took blood pressure meds before bed, so she took them.     Amlodipine 2.5mg  Losartan 50mg    Took blood pressure when she woke up 164/84    Now is worried that she took the medication twice.     Took the meds a half hour ago    Now BP is 175/87    No symptoms of hypotension     Triaged to a disposition of Call pharmacist  Now. Patient is agreeable.     COVID 19 Nurse Triage Plan/Patient Instructions    Please be aware that novel coronavirus (COVID-19) may be circulating in the community. If you develop symptoms such as fever, cough, or SOB or if you have concerns about the presence of another infection including coronavirus (COVID-19), please contact your health care provider or visit [email or web address].     Disposition/Instructions    Home care recommended. Follow home care protocol based instructions.    Thank you for taking steps to prevent the spread of this virus.  o Limit your contact with others.  o Wear a simple mask to cover your cough.  o Wash your hands well and often.    Resources    [facility]: About COVID-19: [email or web address]    CDC: What to Do If You're Sick: www.cdc.gov/coronavirus/2019-ncov/about/steps-when-sick.html    CDC: Ending Home Isolation: www.cdc.gov/coronavirus/2019-ncov/hcp/disposition-in-home-patients.html     CDC: Caring for Someone: www.cdc.gov/coronavirus/2019-ncov/if-you-are-sick/care-for-someone.html     [facility]: Interim Guidance for Hospital Discharge to Home: www.health.[facility].[place]/diseases/coronavirus/hcp/hospdischarge.pdf    [facility] clinical trials (COVID-19 research studies): [email or web address]-trials     Below are the COVID-19 hotlines at the [place] Department of [facility] ([facility]). Interpreters are available.   o For health questions: Call 196-713-2805 or 1-195.439.1586 (7 a.m. to 7 p.m.)  o For questions about schools and childcare: Call  760.279.9797 or 1-203.459.2150 (7 a.m. to 7 p.m.)     Reason for Disposition    [1] DOUBLE DOSE (an extra dose or lesser amount) of prescription drug AND [2] NO symptoms (Exception: a double dose of antibiotics)    Additional Information    Negative: Drug overdose and nurse unable to answer question    Negative: Caller requesting information not related to medicine    Negative: Caller requesting a prescription for Strep throat and has a positive culture result    Negative: Rash while taking a medication or within 3 days of stopping it    Negative: Immunization reaction suspected    Negative: [1] Asthma AND [2] having symptoms of asthma (cough, wheezing, etc)    Negative: MORE THAN A DOUBLE DOSE of a prescription or over-the-counter (OTC) drug    Negative: [1] DOUBLE DOSE (an extra dose or lesser amount) of over-the-counter (OTC) drug AND [2] any symptoms (e.g., dizziness, nausea, pain, sleepiness)    Negative: [1] DOUBLE DOSE (an extra dose or lesser amount) of prescription drug AND [2] any symptoms (e.g., dizziness, nausea, pain, sleepiness)    Negative: Took another person's prescription drug    Protocols used: MEDICATION QUESTION CALL-A-    Daphne Roque RN on 2/14/2021 at 1:01 AM

## 2021-03-27 ENCOUNTER — HEALTH MAINTENANCE LETTER (OUTPATIENT)
Age: 71
End: 2021-03-27

## 2021-09-11 ENCOUNTER — HEALTH MAINTENANCE LETTER (OUTPATIENT)
Age: 71
End: 2021-09-11

## 2022-01-14 ENCOUNTER — APPOINTMENT (OUTPATIENT)
Dept: GENERAL RADIOLOGY | Facility: CLINIC | Age: 72
End: 2022-01-14
Attending: EMERGENCY MEDICINE
Payer: COMMERCIAL

## 2022-01-14 ENCOUNTER — HOSPITAL ENCOUNTER (EMERGENCY)
Facility: CLINIC | Age: 72
Discharge: HOME | End: 2022-01-14
Attending: EMERGENCY MEDICINE | Admitting: EMERGENCY MEDICINE
Payer: COMMERCIAL

## 2022-01-14 ENCOUNTER — APPOINTMENT (OUTPATIENT)
Dept: CT IMAGING | Facility: CLINIC | Age: 72
End: 2022-01-14
Attending: EMERGENCY MEDICINE
Payer: COMMERCIAL

## 2022-01-14 VITALS
WEIGHT: 147 LBS | HEIGHT: 66 IN | TEMPERATURE: 97.7 F | HEART RATE: 66 BPM | DIASTOLIC BLOOD PRESSURE: 99 MMHG | SYSTOLIC BLOOD PRESSURE: 183 MMHG | BODY MASS INDEX: 23.63 KG/M2 | OXYGEN SATURATION: 95 % | RESPIRATION RATE: 16 BRPM

## 2022-01-14 DIAGNOSIS — I10 HYPERTENSION, UNSPECIFIED TYPE: ICD-10-CM

## 2022-01-14 LAB
ALBUMIN SERPL-MCNC: 3.8 G/DL (ref 3.4–5)
ALBUMIN UR-MCNC: NEGATIVE MG/DL
ALP SERPL-CCNC: 76 U/L (ref 40–150)
ALT SERPL W P-5'-P-CCNC: 28 U/L (ref 0–50)
ANION GAP SERPL CALCULATED.3IONS-SCNC: 6 MMOL/L (ref 3–14)
APPEARANCE UR: CLEAR
AST SERPL W P-5'-P-CCNC: 20 U/L (ref 0–45)
BACTERIA #/AREA URNS HPF: ABNORMAL /HPF
BASOPHILS # BLD AUTO: 0.1 10E3/UL (ref 0–0.2)
BASOPHILS NFR BLD AUTO: 1 %
BILIRUB SERPL-MCNC: 0.6 MG/DL (ref 0.2–1.3)
BILIRUB UR QL STRIP: NEGATIVE
BUN SERPL-MCNC: 12 MG/DL (ref 7–30)
CALCIUM SERPL-MCNC: 8.9 MG/DL (ref 8.5–10.1)
CHLORIDE BLD-SCNC: 108 MMOL/L (ref 94–109)
CO2 SERPL-SCNC: 27 MMOL/L (ref 20–32)
COLOR UR AUTO: ABNORMAL
CREAT SERPL-MCNC: 0.61 MG/DL (ref 0.52–1.04)
EOSINOPHIL # BLD AUTO: 0.1 10E3/UL (ref 0–0.7)
EOSINOPHIL NFR BLD AUTO: 4 %
ERYTHROCYTE [DISTWIDTH] IN BLOOD BY AUTOMATED COUNT: 12.7 % (ref 10–15)
GFR SERPL CREATININE-BSD FRML MDRD: >90 ML/MIN/1.73M2
GLUCOSE BLD-MCNC: 90 MG/DL (ref 70–99)
GLUCOSE UR STRIP-MCNC: NEGATIVE MG/DL
HCT VFR BLD AUTO: 41.8 % (ref 35–47)
HGB BLD-MCNC: 13.9 G/DL (ref 11.7–15.7)
HGB UR QL STRIP: NEGATIVE
IMM GRANULOCYTES # BLD: 0 10E3/UL
IMM GRANULOCYTES NFR BLD: 0 %
KETONES UR STRIP-MCNC: 10 MG/DL
LEUKOCYTE ESTERASE UR QL STRIP: ABNORMAL
LYMPHOCYTES # BLD AUTO: 1 10E3/UL (ref 0.8–5.3)
LYMPHOCYTES NFR BLD AUTO: 28 %
MCH RBC QN AUTO: 29 PG (ref 26.5–33)
MCHC RBC AUTO-ENTMCNC: 33.3 G/DL (ref 31.5–36.5)
MCV RBC AUTO: 87 FL (ref 78–100)
MONOCYTES # BLD AUTO: 0.4 10E3/UL (ref 0–1.3)
MONOCYTES NFR BLD AUTO: 13 %
MUCOUS THREADS #/AREA URNS LPF: PRESENT /LPF
NEUTROPHILS # BLD AUTO: 1.9 10E3/UL (ref 1.6–8.3)
NEUTROPHILS NFR BLD AUTO: 54 %
NITRATE UR QL: NEGATIVE
NRBC # BLD AUTO: 0 10E3/UL
NRBC BLD AUTO-RTO: 0 /100
NT-PROBNP SERPL-MCNC: 517 PG/ML (ref 0–900)
PH UR STRIP: 6.5 [PH] (ref 5–7)
PLATELET # BLD AUTO: 242 10E3/UL (ref 150–450)
POTASSIUM BLD-SCNC: 3.2 MMOL/L (ref 3.4–5.3)
PROT SERPL-MCNC: 7.3 G/DL (ref 6.8–8.8)
RBC # BLD AUTO: 4.8 10E6/UL (ref 3.8–5.2)
RBC URINE: <1 /HPF
SODIUM SERPL-SCNC: 141 MMOL/L (ref 133–144)
SP GR UR STRIP: 1.01 (ref 1–1.03)
SQUAMOUS EPITHELIAL: 1 /HPF
TROPONIN I SERPL HS-MCNC: 6 NG/L
UROBILINOGEN UR STRIP-MCNC: NORMAL MG/DL
WBC # BLD AUTO: 3.5 10E3/UL (ref 4–11)
WBC URINE: 2 /HPF

## 2022-01-14 PROCEDURE — 83880 ASSAY OF NATRIURETIC PEPTIDE: CPT | Performed by: EMERGENCY MEDICINE

## 2022-01-14 PROCEDURE — 250N000013 HC RX MED GY IP 250 OP 250 PS 637: Performed by: EMERGENCY MEDICINE

## 2022-01-14 PROCEDURE — 70450 CT HEAD/BRAIN W/O DYE: CPT | Mod: 26 | Performed by: RADIOLOGY

## 2022-01-14 PROCEDURE — 99285 EMERGENCY DEPT VISIT HI MDM: CPT | Mod: 25 | Performed by: EMERGENCY MEDICINE

## 2022-01-14 PROCEDURE — 71046 X-RAY EXAM CHEST 2 VIEWS: CPT

## 2022-01-14 PROCEDURE — 85025 COMPLETE CBC W/AUTO DIFF WBC: CPT | Performed by: EMERGENCY MEDICINE

## 2022-01-14 PROCEDURE — 82040 ASSAY OF SERUM ALBUMIN: CPT | Performed by: EMERGENCY MEDICINE

## 2022-01-14 PROCEDURE — 93010 ELECTROCARDIOGRAM REPORT: CPT | Performed by: EMERGENCY MEDICINE

## 2022-01-14 PROCEDURE — 93005 ELECTROCARDIOGRAM TRACING: CPT | Performed by: EMERGENCY MEDICINE

## 2022-01-14 PROCEDURE — 84484 ASSAY OF TROPONIN QUANT: CPT | Performed by: EMERGENCY MEDICINE

## 2022-01-14 PROCEDURE — 80053 COMPREHEN METABOLIC PANEL: CPT | Performed by: EMERGENCY MEDICINE

## 2022-01-14 PROCEDURE — 70450 CT HEAD/BRAIN W/O DYE: CPT

## 2022-01-14 PROCEDURE — 36415 COLL VENOUS BLD VENIPUNCTURE: CPT | Performed by: EMERGENCY MEDICINE

## 2022-01-14 PROCEDURE — 71046 X-RAY EXAM CHEST 2 VIEWS: CPT | Mod: 26 | Performed by: RADIOLOGY

## 2022-01-14 PROCEDURE — 81001 URINALYSIS AUTO W/SCOPE: CPT | Performed by: EMERGENCY MEDICINE

## 2022-01-14 RX ORDER — AMLODIPINE BESYLATE 2.5 MG/1
2.5 TABLET ORAL ONCE
Status: DISCONTINUED | OUTPATIENT
Start: 2022-01-14 | End: 2022-01-14

## 2022-01-14 RX ORDER — LOSARTAN POTASSIUM 50 MG/1
50 TABLET ORAL ONCE
Status: DISCONTINUED | OUTPATIENT
Start: 2022-01-14 | End: 2022-01-14

## 2022-01-14 RX ORDER — POTASSIUM CHLORIDE 750 MG/1
20 TABLET, EXTENDED RELEASE ORAL ONCE
Status: COMPLETED | OUTPATIENT
Start: 2022-01-14 | End: 2022-01-14

## 2022-01-14 RX ADMIN — POTASSIUM CHLORIDE 20 MEQ: 750 TABLET, EXTENDED RELEASE ORAL at 12:30

## 2022-01-14 ASSESSMENT — MIFFLIN-ST. JEOR: SCORE: 1188.54

## 2022-01-14 NOTE — DISCHARGE INSTRUCTIONS
Please make an appointment to follow up with Your Primary Care Provider as soon as possible to discuss your blood pressure medications.    Return to the ED if you develop severe headache, numbness, weakness, chest pain, shortness of breath, back pain, abdominal pain, fever, slurred speech, vision changes, or any new or worsening concerns.

## 2022-01-14 NOTE — ED TRIAGE NOTES
Pt arrives ambulatory with complaints of high blood pressure. Pt noticed BP rising last evening, took and additional dose of amlodipine. Takes amplodine and losartan daily.

## 2022-01-15 LAB
ATRIAL RATE - MUSE: 67 BPM
DIASTOLIC BLOOD PRESSURE - MUSE: NORMAL MMHG
INTERPRETATION ECG - MUSE: NORMAL
P AXIS - MUSE: 0 DEGREES
PR INTERVAL - MUSE: 130 MS
QRS DURATION - MUSE: 86 MS
QT - MUSE: 426 MS
QTC - MUSE: 450 MS
R AXIS - MUSE: 30 DEGREES
SYSTOLIC BLOOD PRESSURE - MUSE: NORMAL MMHG
T AXIS - MUSE: 36 DEGREES
VENTRICULAR RATE- MUSE: 67 BPM

## 2022-02-26 ENCOUNTER — HEALTH MAINTENANCE LETTER (OUTPATIENT)
Age: 72
End: 2022-02-26

## 2022-03-07 NOTE — ED PROVIDER NOTES
ED Provider Note  [facility]      History     Chief Complaint   Patient presents with     Hypertension     HPI  Alona Sosa is a 71 year old female with history of hypertension, hypothyroidism, hyperlipidemia, iron deficiency anemia, anxiety who presents for evaluation of hypertension. She states that she noticed her blood pressure was higher than normal last night and she took an additional dose of her amlodipine. She is also on losartan which is prescribed 50 mg BID per record review. Per record review, she is on 2.5 mg of amlodipine daily. She a mild headache this morning that is now resolved. She reports some mild shortness of breath yesterday that resolved. No current shortness of breath. No chest pain. No numbness, tingling, weakness, nausea, vomiting, diarrhea, fever, cough, vision changes, dizziness, light headedness, speech difficulties, or urinary symptoms. No history of stroke.     Past Medical History  Past Medical History:   Diagnosis Date     Closed fracture of metatarsal bone 3/20/2007     Problem list name updated by automated process. Provider to review     Unspecified essential hypertension      Unspecified hypothyroidism      History reviewed. No pertinent surgical history.  alpha-lipoic acid 100 MG capsule  aspirin (ASA) 81 MG tablet  co-enzyme Q-10 (COQ10) 100 MG CAPS  fish oil-omega-3 fatty acids (FISH OIL) 1000 MG capsule  levothyroxine (SYNTHROID/LEVOTHROID) 100 MCG tablet  lisinopril (ZESTRIL) 10 MG tablet  losartan (COZAAR) 50 MG tablet  lovastatin (MEVACOR) 40 MG tablet  Lutein 20 MG CAPS  Menaquinone-7 (VITAMIN K2) 100 MCG CAPS  potassium chloride ER (KLOR-CON M) 10 MEQ CR tablet  thyroid (ARMOUR) 30 MG tablet  vitamin (B COMPLEX-C) tablet  vitamin C (ASCORBIC ACID) 1000 MG TABS  vitamin E 400 UNIT TABS      Allergies   Allergen Reactions     Macrobid [Nitrofurantoin Anhydrous] Swelling     Family History  Family History   Problem Relation Age of Onset      "Hypertension Mother      Eye Disorder Mother      Respiratory Father         TB     Cerebrovascular Disease Father      Diabetes Father      Alcohol/Drug Father      Hypertension Father      Diabetes Maternal Grandmother      Cerebrovascular Disease Maternal Grandmother      Hypertension Maternal Grandmother      Blood Disease Maternal Grandmother      Respiratory Maternal Grandfather         TB     Alcohol/Drug Maternal Grandfather      Hypertension Sister      Heart Disease Sister      Gastrointestinal Disease Daughter      Neurologic Disorder Daughter         BRAIN TUMOR     Hypertension Daughter      Gastrointestinal Disease Daughter      Social History   Social History     Tobacco Use     Smoking status: Former Smoker     Quit date: 1975     Years since quittin.4     Smokeless tobacco: Never Used   Substance Use Topics     Alcohol use: Yes     Comment: OCC     Drug use: No      Past medical history, past surgical history, medications, allergies, family history, and social history were reviewed with the patient. No additional pertinent items.       Review of Systems  A complete review of systems was performed with pertinent positives and negatives noted in the HPI, and all other systems negative.    Physical Exam   BP: (!) 181/84  Pulse: 74  Temp: 97.7  F (36.5  C)  Resp: 16  Height: 167.6 cm (5' 6\")  Weight: 66.7 kg (147 lb)  SpO2: 97 %  Physical Exam  Vitals reviewed.   Constitutional:       General: She is not in acute distress.     Appearance: She is well-developed.   HENT:      Head: Normocephalic and atraumatic.   Eyes:      Extraocular Movements: Extraocular movements intact.      Conjunctiva/sclera: Conjunctivae normal.      Pupils: Pupils are equal, round, and reactive to light.   Neck:      Vascular: No carotid bruit.   Cardiovascular:      Rate and Rhythm: Normal rate and regular rhythm.      Pulses: Normal pulses.      Heart sounds: Normal heart sounds. No murmur heard.  Pulmonary:      " Effort: Pulmonary effort is normal. No respiratory distress.      Breath sounds: Normal breath sounds. No wheezing or rales.   Abdominal:      General: Bowel sounds are normal. There is no distension.      Palpations: Abdomen is soft. There is no mass.      Tenderness: There is no abdominal tenderness. There is no guarding or rebound.   Musculoskeletal:         General: No swelling or tenderness. Normal range of motion.      Cervical back: Normal range of motion and neck supple. No rigidity.   Skin:     General: Skin is warm and dry.      Capillary Refill: Capillary refill takes less than 2 seconds.      Findings: No rash.   Neurological:      General: No focal deficit present.      Mental Status: She is alert and oriented to person, place, and time.      GCS: GCS eye subscore is 4. GCS verbal subscore is 5. GCS motor subscore is 6.      Cranial Nerves: Cranial nerves are intact. No cranial nerve deficit, dysarthria or facial asymmetry.      Sensory: Sensation is intact. No sensory deficit.      Motor: No weakness, abnormal muscle tone or pronator drift.      Coordination: Coordination normal. Finger-Nose-Finger Test normal.      Gait: Gait normal.      Comments: 5/5 strength in all 4 extremities bilaterally. Sensation intact to light touch in all 4 extremities and the face bilaterally.    Psychiatric:         Mood and Affect: Mood normal.         ED Course      Procedures            EKG Interpretation:      Interpreted by Yazmin Padilla MD  Time reviewed: 8 am  Symptoms at time of EKG: hypertension   Rhythm: normal sinus   Rate: normal  Axis: normal  Ectopy: none  Conduction: normal  ST Segments/ T Waves: No ST-T wave changes  Q Waves: none  Comparison to prior: Unchanged    Clinical Impression: no evidence of acute ischemia. Normal sinus rhythm.         The medical record was reviewed and interpreted.  Current labs reviewed and interpreted.  Current images reviewed and interpreted: as below.  EKG reviewed and  interpreted: as above.              Results for orders placed or performed during the hospital encounter of 01/14/22   CT Head w/o Contrast     Status: None    Narrative    CT HEAD W/O CONTRAST 1/14/2022 9:24 AM    History: Elevated blood pressure, headache.   ICD-10:    Comparison: Brain MRI 4/1/2020.    Technique: Using multidetector thin collimation helical acquisition  technique, axial, coronal and sagittal CT images from the skull base  to the vertex were obtained without intravenous contrast.   (topogram) image(s) also obtained and reviewed.    Findings: There is no intracranial hemorrhage, mass effect, or midline  shift. Gray/white matter differentiation in both cerebral hemispheres  is preserved. Ventricles are proportionate to the cerebral sulci. The  basal cisterns are clear. Unchanged incidental dilated perivascular  space in the inferior right lentiform nucleus.    The bony calvaria and the bones of the skull base are normal. The  visualized portions of the paranasal sinuses and mastoid air cells are  clear.      Impression    Impression:  No acute intracranial pathology.     LU LOMAX MD         SYSTEM ID:  [id number]   XR Chest 2 Views     Status: None    Narrative    EXAM: XR CHEST 2 VW  1/14/2022 9:46 AM     HISTORY:  hypertension, shortness of breath, eval for pulm edema       COMPARISON:  Chest radiograph 4/1/2020, 1/28/2006    TECHNIQUE: 2 views of the chest    FINDINGS:   Upright PA and lateral views of the chest. Trachea is midline. Stable  cardiac silhouette. Chronic blunting of the right costophrenic angle,  likely scarring. Scattered calcified granulomata throughout the lungs.  Stable appearance of opacities in the right midlung, likely  atelectatic change. No definite pleural effusion, pneumothorax or  focal consolidation. No acute osseous abnormalities.      Impression    IMPRESSION: No acute cardiopulmonary findings. Chronic blunting of the  right costophrenic angle dating back to  2006, likely scarring.     I have personally reviewed the examination and initial interpretation  and I agree with the findings.    BANDAR WALTERS MD         SYSTEM ID:  [id number]   Comprehensive metabolic panel     Status: Abnormal   Result Value Ref Range    Sodium 141 133 - 144 mmol/L    Potassium 3.2 (L) 3.4 - 5.3 mmol/L    Chloride 108 94 - 109 mmol/L    Carbon Dioxide (CO2) 27 20 - 32 mmol/L    Anion Gap 6 3 - 14 mmol/L    Urea Nitrogen 12 7 - 30 mg/dL    Creatinine 0.61 0.52 - 1.04 mg/dL    Calcium 8.9 8.5 - 10.1 mg/dL    Glucose 90 70 - 99 mg/dL    Alkaline Phosphatase 76 40 - 150 U/L    AST 20 0 - 45 U/L    ALT 28 0 - 50 U/L    Protein Total 7.3 6.8 - 8.8 g/dL    Albumin 3.8 3.4 - 5.0 g/dL    Bilirubin Total 0.6 0.2 - 1.3 mg/dL    GFR Estimate >90 >60 mL/min/1.73m2   UA with Microscopic reflex to Culture     Status: Abnormal    Specimen: Urine, Clean Catch   Result Value Ref Range    Color Urine Light Yellow Colorless, Straw, Light Yellow, Yellow    Appearance Urine Clear Clear    Glucose Urine Negative Negative mg/dL    Bilirubin Urine Negative Negative    Ketones Urine 10  (A) Negative mg/dL    Specific Gravity Urine 1.012 1.003 - 1.035    Blood Urine Negative Negative    pH Urine 6.5 5.0 - 7.0    Protein Albumin Urine Negative Negative mg/dL    Urobilinogen Urine Normal Normal, 2.0 mg/dL    Nitrite Urine Negative Negative    Leukocyte Esterase Urine Trace (A) Negative    Bacteria Urine Few (A) None Seen /HPF    Mucus Urine Present (A) None Seen /LPF    RBC Urine <1 <=2 /HPF    WBC Urine 2 <=5 /HPF    Squamous Epithelials Urine 1 <=1 /HPF    Narrative    Urine Culture not indicated   Troponin I     Status: Normal   Result Value Ref Range    Troponin I High Sensitivity 6 <54 ng/L   Nt probnp inpatient (BNP)     Status: Normal   Result Value Ref Range    N terminal Pro BNP Inpatient 517 0 - 900 pg/mL   CBC with platelets and differential     Status: Abnormal   Result Value Ref Range    WBC Count 3.5 (L) 4.0  - 11.0 10e3/uL    RBC Count 4.80 3.80 - 5.20 10e6/uL    Hemoglobin 13.9 11.7 - 15.7 g/dL    Hematocrit 41.8 35.0 - 47.0 %    MCV 87 78 - 100 fL    MCH 29.0 26.5 - 33.0 pg    MCHC 33.3 31.5 - 36.5 g/dL    RDW 12.7 10.0 - 15.0 %    Platelet Count 242 150 - 450 10e3/uL    % Neutrophils 54 %    % Lymphocytes 28 %    % Monocytes 13 %    % Eosinophils 4 %    % Basophils 1 %    % Immature Granulocytes 0 %    NRBCs per 100 WBC 0 <1 /100    Absolute Neutrophils 1.9 1.6 - 8.3 10e3/uL    Absolute Lymphocytes 1.0 0.8 - 5.3 10e3/uL    Absolute Monocytes 0.4 0.0 - 1.3 10e3/uL    Absolute Eosinophils 0.1 0.0 - 0.7 10e3/uL    Absolute Basophils 0.1 0.0 - 0.2 10e3/uL    Absolute Immature Granulocytes 0.0 <=0.4 10e3/uL    Absolute NRBCs 0.0 10e3/uL   EKG 12 lead     Status: None   Result Value Ref Range    Systolic Blood Pressure  mmHg    Diastolic Blood Pressure  mmHg    Ventricular Rate 67 BPM    Atrial Rate 67 BPM    UT Interval 130 ms    QRS Duration 86 ms     ms    QTc 450 ms    P Axis 0 degrees    R AXIS 30 degrees    T Axis 36 degrees    Interpretation ECG       Sinus rhythm  Normal ECG    Unconfirmed report - interpretation of this ECG is computer generated - see medical record for final interpretation  Confirmed by - EMERGENCY ROOM, PHYSICIAN ([id number]),  AMANDA HENAO ([id number]) on 1/15/2022 11:52:36 AM     CBC with platelets differential     Status: Abnormal    Narrative    The following orders were created for panel order CBC with platelets differential.  Procedure                               Abnormality         Status                     ---------                               -----------         ------                     CBC with platelets and d...[[id number]]  Abnormal            Final result                 Please view results for these tests on the individual orders.     Medications   potassium chloride ER (KLOR-CON M) CR tablet 20 mEq (20 mEq Oral Given 1/14/22 1230)        Assessments & Plan (with  Medical Decision Making)   On exam, patient is overall well appearing and in no acute distress. Bp here is 181/84. Remainder of her vital signs are within normal limits and she is afebrile. She denies any significant complaints at this time but does report she previously had a headache and some mild shortness of breath. No chest pain. No leg swelling or signs of volume overload here. She has no focal neurologic deficits. She has history of hypertension and has not yet taken her morning medications as she presented here. With her previous symptoms, will perform laboratory work up and obtain CXR and head CT to evaluate for any signs of end organ damage. CT of the head without intracranial hemorrhage or other acute pathology. CXR without any acute pathology. No pulmonary edema. EKG normal sinus rhythm without evidence of acute ischemia. Labs show slightly low potassium of 3.2 that was replaced here. Creatinine within normal limits. CBC with WBC of 3.5 and hemoglobin if 13.9. No infectious symptoms. BNP and troponin within normal limits. UA without protein or evidence of UTI or hematuria. No signs of end organ damage on evaluation here and BP spontaneously in the 170s/80s. Discussed that she may warranted medication changes but should follow up with her PCP to discuss. She is asymptomatic here and in agreement with discharge. She was given indications for return. She was comfortable with the plan. She was discharged in stable condition.     I have reviewed the nursing notes. I have reviewed the findings, diagnosis, plan and need for follow up with the patient.    Discharge Medication List as of 1/14/2022 12:23 PM          Final diagnoses:   Hypertension, unspecified type       --  Yazmin Padilla MD  [facility] EMERGENCY DEPARTMENT  1/14/2022     Yazmin Padilla MD  03/07/22 1827

## 2022-04-23 ENCOUNTER — HEALTH MAINTENANCE LETTER (OUTPATIENT)
Age: 72
End: 2022-04-23

## 2022-09-29 ENCOUNTER — TELEPHONE (OUTPATIENT)
Dept: ORTHOPEDICS | Facility: CLINIC | Age: 72
End: 2022-09-29

## 2022-09-29 DIAGNOSIS — M17.0 PRIMARY OSTEOARTHRITIS OF BOTH KNEES: Primary | ICD-10-CM

## 2022-09-29 NOTE — TELEPHONE ENCOUNTER
Patient scheduled for appointment on 10/11/22 for discussion of viscosupplementation injection vs steroid injection of bilateral knee.      Steroid  injection last completed 09/2021 in right, no prior on left.     Prior authorization referral for SynviscOne injection pended.     Please advise.    Agapito Hernández ATC

## 2022-10-07 ENCOUNTER — TELEPHONE (OUTPATIENT)
Dept: ORTHOPEDICS | Facility: CLINIC | Age: 72
End: 2022-10-07

## 2022-10-07 NOTE — TELEPHONE ENCOUNTER
[facility] Call Center    Phone Message    May a detailed message be left on voicemail: yes     Reason for Call: Other: rita calling in re: apt on 10/11 with Dr Neymar Osman in [place] for injection.  Pt asking for PRP injection on 10/11.  Does not want to get the Synvisc One inj. PT would like calll back at [phone]     Action Taken: Message routed to:  Other: Dr. Neymar Osman    Travel Screening: Not Applicable

## 2022-10-10 ENCOUNTER — TELEPHONE (OUTPATIENT)
Dept: ORTHOPEDICS | Facility: CLINIC | Age: 72
End: 2022-10-10

## 2022-10-10 NOTE — TELEPHONE ENCOUNTER
LVM with detailed information regarding that patient would need to have consultation regarding PRP prior to procedure being completed.   The initial consultation and procedure CANNOT be completed on the same day.    Agapito Hernández, ATC

## 2022-10-10 NOTE — TELEPHONE ENCOUNTER
[facility] Call Center    Phone Message    May a detailed message be left on voicemail: yes     Reason for Call: Pt does not want the synvisc inj she is asking for a PRP not sure if this can be done at her appt tomorrow or if it is scheduled differently or prior auth is needed please let pt know if it is still ok to come to her appt     Action Taken: Other: ortho csc    Travel Screening: Not Applicable

## 2022-10-11 ENCOUNTER — OFFICE VISIT (OUTPATIENT)
Dept: ORTHOPEDICS | Facility: CLINIC | Age: 72
End: 2022-10-11
Payer: COMMERCIAL

## 2022-10-11 VITALS
WEIGHT: 151 LBS | HEIGHT: 66 IN | BODY MASS INDEX: 24.27 KG/M2 | SYSTOLIC BLOOD PRESSURE: 189 MMHG | DIASTOLIC BLOOD PRESSURE: 107 MMHG

## 2022-10-11 DIAGNOSIS — M25.561 ACUTE PAIN OF RIGHT KNEE: ICD-10-CM

## 2022-10-11 DIAGNOSIS — M17.0 PRIMARY OSTEOARTHRITIS OF BOTH KNEES: Primary | ICD-10-CM

## 2022-10-11 PROCEDURE — 99203 OFFICE O/P NEW LOW 30 MIN: CPT | Mod: 25 | Performed by: FAMILY MEDICINE

## 2022-10-11 PROCEDURE — 20611 DRAIN/INJ JOINT/BURSA W/US: CPT | Mod: RT | Performed by: FAMILY MEDICINE

## 2022-10-11 NOTE — LETTER
10/11/2022         RE: Alona Sosa  [id number] [facility] [id number]        Dear Colleague,    Thank you for referring your patient, Alona Sosa, to the [facility] [place]. Please see a copy of my visit note below.    Alona Sosa  :  1950  DOS: 10/11/22  MRN: [id number]    Sports Medicine Clinic Visit    PCP: Tra Browning    Alona Sosa is a 67 year old female who is seen as a self referral presenting with chronic right knee pain.    Injury: Gradual onset of right knee pain over the last ~ 2 years, pain mildly improved over last ~ 2 weeks.  Pain located over right anterior, medial knee, nonradiating.  Additional Features:  Positive: grinding and weakness.  Symptoms are better with Rest and exercise program.  Symptoms are worse with: kneeling, going from sit to stand.  Other evaluation and/or treatments so far consists of: Ibuprofen, Rest and exercise program.  Recent imaging completed: No recent imaging completed.  Prior History of related problems: none    Social History: retired     Interim History - 2019  Since last visit on 18 patient has moderate right knee pain.  Right knee steroid injection completed on  provided moderate relief.  Patient notes that she fell shortly after injection landing directly on right knee and didn't note much relief following that point.  Desires trial of repeat injection today.  No new injury in the interim.    Interim History - 2022  Since last visit on 19 patient has moderate-severe right knee pain over the past ~ 6+ weeks.  Right knee steroid injection last completed on 22 by Dr Saavedra @ [facility] provided relief for ~ 6 - 8 weeks.  No new injury in the interim.  Recent x-rays last updated on 22 @ [facility].      Review of Systems  Musculoskeletal: as above  Remainder of review of systems is negative including constitutional, CV, pulmonary, GI, Skin and  "Neurologic except as noted in HPI or medical history.    Past Medical History:   Diagnosis Date     Closed fracture of metatarsal bone 3/20/2007     Problem list name updated by automated process. Provider to review     Unspecified essential hypertension      Unspecified hypothyroidism      No past surgical history on file.    Objective  BP (!) 189/107   Ht 1.676 m (5' 6\")   Wt 68.5 kg (151 lb)   BMI 24.37 kg/m      General: healthy, alert and in no distress    HEENT: no scleral icterus or conjunctival erythema   Skin: no suspicious lesions or rash. No jaundice.   CV: regular rhythm by palpation, 2+ distal pulses, no pedal edema    Resp: normal respiratory effort without conversational dyspnea   Psych: normal mood and affect    Gait: minimally antalgic, appropriate coordination and balance   Neuro: normal light touch sensory exam of the extremities. Motor strength as noted below     Right Knee exam    ROM:        Flexion 140 degrees, symmetric       Extension 0 degrees       Mild pain in terminal flexion    Inspection:       no visible ecchymosis        effusion noted trace    Skin:       no visible deformities       well perfused       capillary refill brisk    Patellar Motion:        No significant lateral tilt noted in patella       Crepitus noted in the patellofemoral joint    Tender:        lateral patellar border       medial joint line    Non Tender:         remainder of knee area        medial patellar border        lateral joint line        along MCL        distal IT Band        infrapatellar tendon        tibial tubercle       pes anserine bursa    Special Tests:        Painful medial Rajesh       + PF grind       neg (-) varus at 0 deg and 30 deg       neg (-) valgus at 0 deg and 30 deg    Evaluation of ipsilateral kinetic chain       normal strength with hip extension and abduction, but somewhat deconditioned on R, especially with quad activation      Radiology  XR images independently visualized " and reviewed with patient today in clinic    KNEE STANDING AP BILATERAL SUNRISE BILATERAL LATERAL RIGHT   6/26/2018  8:29 AM      HISTORY: Chronic pain of right knee     COMPARISON: None.                                                         IMPRESSION:   Right knee: Moderate loss of the medial joint space with marginal  osteophytic spurring and subchondral sclerosis suggestive of  degenerative change. Moderate joint effusion. No evidence of fracture.  Alignment appears to be within normal limits. [place] view is  unremarkable.     Left knee: Mild/moderate loss of the medial femoral tibial joint space  with mild subchondral sclerosis. No evidence of fracture. [place] view  is unremarkable.    Large Joint Injection/Arthocentesis: R knee joint    Date/Time: 10/11/2022 8:50 AM  Performed by: Neymar Osman DO  Authorized by: Neymar Osman DO     Indications:  Osteoarthritis  Needle Size:  21 G  Guidance: ultrasound    Approach:  Superolateral  Location:  Knee      Medications:  48 mg hylan 48 MG/6ML  Aspirate amount (mL):  2  Aspirate:  Serous and yellow  Outcome:  Tolerated well, no immediate complications  Procedure discussed: discussed risks, benefits, and alternatives    Consent Given by:  Patient  Timeout: timeout called immediately prior to procedure    Prep: patient was prepped and draped in usual sterile fashion     Ultrasound images of procedure were permanently stored.      Assessment:  No diagnosis found.    Plan:  Discussed the assessment with the patient.  Follow up: prn  Discussed HEP and reviewed low impact activity strategies  Reviewed wt loss, activity modification and progressive increase in activity as tolerated and guided by pain  Reviewed options for potential steroid vs viscosupplementation injections and the possibility for future orthopedic referral prn  Repeat CSI today, US guided, can consider viscosupplementation trial based on quality and duration of benefit from  injection today  Reviewed safe and appropriate OTC medication choices, try tylenol first  Up to 3000mg daily of tylenol is generally safe, NSAID dosing and duration limitations reviewed  Discussed nature of degenerative arthrosis of the knee.   Discussed symptom treatment with ice or heat, topical treatments, and rest if needed.   Expectations and goals of CSI reviewed  Often 2-3 days for steroid effect, and can take up to two weeks for maximum effect  We discussed modified progressive pain-free activity as tolerated  Do not overuse in first two weeks if feeling better due to concern for vulnerability while steroid is working  Supportive care reviewed  All questions were answered today  Contact us with additional questions or concerns  Signs and sx of concern reviewed      Neymar Osman DO, CAQ  Primary Care Sports Medicine  [facility]           Disclaimer: This note consists of symbols derived from keyboarding, dictation and/or voice recognition software. As a result, there may be errors in the script that have gone undetected. Please consider this when interpreting information found in this chart.      Again, thank you for allowing me to participate in the care of your patient.        Sincerely,        Neymar Osman DO

## 2022-10-11 NOTE — PROGRESS NOTES
Alona Sosa  :  1950  DOS: 10/11/22  MRN: [id number]    Sports Medicine Clinic Visit    PCP: Tra Browning    Alona Sosa is a 67 year old female who is seen as a self referral presenting with chronic right knee pain.    Injury: Gradual onset of right knee pain over the last ~ 2 years, pain mildly improved over last ~ 2 weeks.  Pain located over right anterior, medial knee, nonradiating.  Additional Features:  Positive: grinding and weakness.  Symptoms are better with Rest and exercise program.  Symptoms are worse with: kneeling, going from sit to stand.  Other evaluation and/or treatments so far consists of: Ibuprofen, Rest and exercise program.  Recent imaging completed: No recent imaging completed.  Prior History of related problems: none    Social History: retired     Interim History - 2019  Since last visit on 18 patient has moderate right knee pain.  Right knee steroid injection completed on  provided moderate relief.  Patient notes that she fell shortly after injection landing directly on right knee and didn't note much relief following that point.  Desires trial of repeat injection today.  No new injury in the interim.    Interim History - 2022  Since last visit on 19 patient has moderate-severe right knee pain over the past ~ 6+ weeks.  Right knee steroid injection last completed on 22 by Dr Saavedra @ [facility] provided relief for ~ 6 - 8 weeks.  No new injury in the interim.  Recent x-rays last updated on 22 @ [facility].      Review of Systems  Musculoskeletal: as above  Remainder of review of systems is negative including constitutional, CV, pulmonary, GI, Skin and Neurologic except as noted in HPI or medical history.    Past Medical History:   Diagnosis Date     Closed fracture of metatarsal bone 3/20/2007     Problem list name updated by automated process. Provider to review     Unspecified essential hypertension      Unspecified  "hypothyroidism      No past surgical history on file.    Objective  BP (!) 189/107   Ht 1.676 m (5' 6\")   Wt 68.5 kg (151 lb)   BMI 24.37 kg/m      General: healthy, alert and in no distress    HEENT: no scleral icterus or conjunctival erythema   Skin: no suspicious lesions or rash. No jaundice.   CV: regular rhythm by palpation, 2+ distal pulses, no pedal edema    Resp: normal respiratory effort without conversational dyspnea   Psych: normal mood and affect    Gait: minimally antalgic, appropriate coordination and balance   Neuro: normal light touch sensory exam of the extremities. Motor strength as noted below     Right Knee exam    ROM:        Flexion 140 degrees, symmetric       Extension 0 degrees       Mild pain in terminal flexion    Inspection:       no visible ecchymosis        effusion noted trace    Skin:       no visible deformities       well perfused       capillary refill brisk    Patellar Motion:        No significant lateral tilt noted in patella       Crepitus noted in the patellofemoral joint    Tender:        lateral patellar border       medial joint line    Non Tender:         remainder of knee area        medial patellar border        lateral joint line        along MCL        distal IT Band        infrapatellar tendon        tibial tubercle       pes anserine bursa    Special Tests:        Painful medial Rajesh       + PF grind       neg (-) varus at 0 deg and 30 deg       neg (-) valgus at 0 deg and 30 deg    Evaluation of ipsilateral kinetic chain       normal strength with hip extension and abduction, but somewhat deconditioned on R, especially with quad activation      Radiology  XR images independently visualized and reviewed with patient today in clinic    KNEE STANDING AP BILATERAL SUNRISE BILATERAL LATERAL RIGHT   6/26/2018  8:29 AM      HISTORY: Chronic pain of right knee     COMPARISON: None.                                                         IMPRESSION:   Right knee: " Moderate loss of the medial joint space with marginal  osteophytic spurring and subchondral sclerosis suggestive of  degenerative change. Moderate joint effusion. No evidence of fracture.  Alignment appears to be within normal limits. Turner view is  unremarkable.     Left knee: Mild/moderate loss of the medial femoral tibial joint space  with mild subchondral sclerosis. No evidence of fracture. Sunrise view  is unremarkable.    Large Joint Injection/Arthocentesis: R knee joint    Date/Time: 10/11/2022 8:50 AM  Performed by: Neymar Osman DO  Authorized by: Neymar Osman DO     Indications:  Osteoarthritis  Needle Size:  21 G  Guidance: ultrasound    Approach:  Superolateral  Location:  Knee      Medications:  48 mg hylan 48 MG/6ML  Aspirate amount (mL):  2  Aspirate:  Serous and yellow  Outcome:  Tolerated well, no immediate complications  Procedure discussed: discussed risks, benefits, and alternatives    Consent Given by:  Patient  Timeout: timeout called immediately prior to procedure    Prep: patient was prepped and draped in usual sterile fashion     Ultrasound images of procedure were permanently stored.      Assessment:  No diagnosis found.    Plan:  Discussed the assessment with the patient.  Follow up: prn  Discussed HEP and reviewed low impact activity strategies  Reviewed wt loss, activity modification and progressive increase in activity as tolerated and guided by pain  Reviewed options for potential steroid vs viscosupplementation injections and the possibility for future orthopedic referral prn  Repeat CSI today, US guided, can consider viscosupplementation trial based on quality and duration of benefit from injection today  Reviewed safe and appropriate OTC medication choices, try tylenol first  Up to 3000mg daily of tylenol is generally safe, NSAID dosing and duration limitations reviewed  Discussed nature of degenerative arthrosis of the knee.   Discussed symptom treatment  with ice or heat, topical treatments, and rest if needed.   Expectations and goals of CSI reviewed  Often 2-3 days for steroid effect, and can take up to two weeks for maximum effect  We discussed modified progressive pain-free activity as tolerated  Do not overuse in first two weeks if feeling better due to concern for vulnerability while steroid is working  Supportive care reviewed  All questions were answered today  Contact us with additional questions or concerns  Signs and sx of concern reviewed      Neymar Osman DO, CALARISA  Primary Care Sports Medicine  [facility]           Disclaimer: This note consists of symbols derived from keyboarding, dictation and/or voice recognition software. As a result, there may be errors in the script that have gone undetected. Please consider this when interpreting information found in this chart.

## 2022-10-29 ENCOUNTER — HEALTH MAINTENANCE LETTER (OUTPATIENT)
Age: 72
End: 2022-10-29

## 2023-06-20 ENCOUNTER — HOSPITAL ENCOUNTER (EMERGENCY)
Facility: CLINIC | Age: 73
Discharge: HOME | End: 2023-06-20
Attending: FAMILY MEDICINE | Admitting: FAMILY MEDICINE
Payer: COMMERCIAL

## 2023-06-20 VITALS
OXYGEN SATURATION: 96 % | DIASTOLIC BLOOD PRESSURE: 84 MMHG | RESPIRATION RATE: 8 BRPM | BODY MASS INDEX: 23.46 KG/M2 | TEMPERATURE: 98.5 F | HEART RATE: 71 BPM | HEIGHT: 66 IN | SYSTOLIC BLOOD PRESSURE: 159 MMHG | WEIGHT: 146 LBS

## 2023-06-20 DIAGNOSIS — I10 HYPERTENSION, UNSPECIFIED TYPE: ICD-10-CM

## 2023-06-20 LAB
ALBUMIN SERPL BCG-MCNC: 4.3 G/DL (ref 3.5–5.2)
ALP SERPL-CCNC: 69 U/L (ref 35–104)
ALT SERPL W P-5'-P-CCNC: 17 U/L (ref 0–50)
ANION GAP SERPL CALCULATED.3IONS-SCNC: 14 MMOL/L (ref 7–15)
AST SERPL W P-5'-P-CCNC: 22 U/L (ref 0–45)
BASOPHILS # BLD AUTO: 0.1 10E3/UL (ref 0–0.2)
BASOPHILS NFR BLD AUTO: 2 %
BILIRUB SERPL-MCNC: 0.4 MG/DL
BUN SERPL-MCNC: 8.9 MG/DL (ref 8–23)
CALCIUM SERPL-MCNC: 9.4 MG/DL (ref 8.8–10.2)
CHLORIDE SERPL-SCNC: 103 MMOL/L (ref 98–107)
CREAT SERPL-MCNC: 0.83 MG/DL (ref 0.51–0.95)
DEPRECATED HCO3 PLAS-SCNC: 23 MMOL/L (ref 22–29)
EOSINOPHIL # BLD AUTO: 0.1 10E3/UL (ref 0–0.7)
EOSINOPHIL NFR BLD AUTO: 3 %
ERYTHROCYTE [DISTWIDTH] IN BLOOD BY AUTOMATED COUNT: 12.4 % (ref 10–15)
GFR SERPL CREATININE-BSD FRML MDRD: 74 ML/MIN/1.73M2
GLUCOSE SERPL-MCNC: 83 MG/DL (ref 70–99)
HCT VFR BLD AUTO: 39 % (ref 35–47)
HGB BLD-MCNC: 13.3 G/DL (ref 11.7–15.7)
IMM GRANULOCYTES # BLD: 0 10E3/UL
IMM GRANULOCYTES NFR BLD: 0 %
LYMPHOCYTES # BLD AUTO: 0.6 10E3/UL (ref 0.8–5.3)
LYMPHOCYTES NFR BLD AUTO: 15 %
MCH RBC QN AUTO: 29.5 PG (ref 26.5–33)
MCHC RBC AUTO-ENTMCNC: 34.1 G/DL (ref 31.5–36.5)
MCV RBC AUTO: 87 FL (ref 78–100)
MONOCYTES # BLD AUTO: 0.5 10E3/UL (ref 0–1.3)
MONOCYTES NFR BLD AUTO: 13 %
NEUTROPHILS # BLD AUTO: 2.9 10E3/UL (ref 1.6–8.3)
NEUTROPHILS NFR BLD AUTO: 67 %
NRBC # BLD AUTO: 0 10E3/UL
NRBC BLD AUTO-RTO: 0 /100
PLATELET # BLD AUTO: 219 10E3/UL (ref 150–450)
POTASSIUM SERPL-SCNC: 3.5 MMOL/L (ref 3.4–5.3)
PROT SERPL-MCNC: 6.8 G/DL (ref 6.4–8.3)
RBC # BLD AUTO: 4.51 10E6/UL (ref 3.8–5.2)
SODIUM SERPL-SCNC: 140 MMOL/L (ref 136–145)
TROPONIN T SERPL HS-MCNC: <6 NG/L
WBC # BLD AUTO: 4.2 10E3/UL (ref 4–11)

## 2023-06-20 PROCEDURE — 93005 ELECTROCARDIOGRAM TRACING: CPT | Performed by: FAMILY MEDICINE

## 2023-06-20 PROCEDURE — 99284 EMERGENCY DEPT VISIT MOD MDM: CPT | Performed by: FAMILY MEDICINE

## 2023-06-20 PROCEDURE — 84484 ASSAY OF TROPONIN QUANT: CPT | Performed by: FAMILY MEDICINE

## 2023-06-20 PROCEDURE — 80053 COMPREHEN METABOLIC PANEL: CPT | Performed by: FAMILY MEDICINE

## 2023-06-20 PROCEDURE — 36415 COLL VENOUS BLD VENIPUNCTURE: CPT | Performed by: FAMILY MEDICINE

## 2023-06-20 PROCEDURE — 99283 EMERGENCY DEPT VISIT LOW MDM: CPT | Performed by: FAMILY MEDICINE

## 2023-06-20 PROCEDURE — G0463 HOSPITAL OUTPT CLINIC VISIT: HCPCS | Performed by: FAMILY MEDICINE

## 2023-06-20 PROCEDURE — 93010 ELECTROCARDIOGRAM REPORT: CPT | Performed by: FAMILY MEDICINE

## 2023-06-20 PROCEDURE — 99284 EMERGENCY DEPT VISIT MOD MDM: CPT | Mod: 25 | Performed by: FAMILY MEDICINE

## 2023-06-20 PROCEDURE — 85025 COMPLETE CBC W/AUTO DIFF WBC: CPT | Performed by: FAMILY MEDICINE

## 2023-06-20 ASSESSMENT — ACTIVITIES OF DAILY LIVING (ADL): ADLS_ACUITY_SCORE: 35

## 2023-06-20 NOTE — ED PROVIDER NOTES
"  HPI   Patient is a 72-year-old female presenting with concern about high blood pressure.  She has a known history of hypertension and she takes amlodipine 2.5 mg daily and losartan 50 mg twice a day.  In the past she would take clonidine as needed for high blood pressure.  Her physician apparently would not refill her prescription and yet she had some at home from years ago.  She took 1 of those today prior to coming in.  She has a history of hypothyroidism, taking Synthroid.  She has a history of vitamin D deficiency and iron deficiency anemia.  She quit smoking in 1975.  Occasional alcohol.  No drugs of abuse.  Recently using nutritional supplements for \"a chemical detox.\"    The patient felt extremely rundown and sick after cleaning out her chicken coop yesterday.  She describes intermittent episodes of nausea and fatigue since then.  She feels occasionally short of breath.  Overnight she had \"sharp pins of pain everywhere.\"  She denies chest pain.  She denies severe headache.  She denies one-sided weakness or incoordination.  No difficulty with speech or vision changes.  No vertigo.  No facial droop.  No severe abdominal, back, flank, or pelvic pain.  No new leg pain or swelling.  No new urinary symptoms.  No vomiting.  No diarrhea.  No hematochezia or melena.        Allergies:  Allergies   Allergen Reactions     Macrobid [Nitrofurantoin Anhydrous] Swelling     Problem List:    Patient Active Problem List    Diagnosis Date Noted     Left facial numbness 04/01/2020     Priority: Medium     Iron deficiency anemia due to chronic blood loss 02/21/2020     Priority: Medium     Numerous moles 01/24/2020     Priority: Medium     Adjustment disorder with anxious mood 01/24/2020     Priority: Medium     Vitamin D deficiency 06/14/2019     Priority: Medium     Narrow pharyngeal airway 06/14/2019     Priority: Medium     Mixed hyperlipidemia 06/14/2019     Priority: Medium     Vaccine refused by patient 10/25/2016     " Priority: Medium     Patient declines flu vaccine, pneumonia vaccines, or shingles vaccine.       Hypertension 2015     Priority: Medium     Vitiligo 2015     Priority: Medium     Hypothyroidism 2007     Priority: Medium     H/O chemical exposure 2007     Priority: Medium     Headache 10/17/2005     Priority: Medium     Problem list name updated by automated process. Provider to review        Past Medical History:    Past Medical History:   Diagnosis Date     Closed fracture of metatarsal bone 3/20/2007     Unspecified essential hypertension      Unspecified hypothyroidism      Past Surgical History:    No past surgical history on file.  Family History:    Family History   Problem Relation Age of Onset     Hypertension Mother      Eye Disorder Mother      Respiratory Father         TB     Cerebrovascular Disease Father      Diabetes Father      Alcohol/Drug Father      Hypertension Father      Diabetes Maternal Grandmother      Cerebrovascular Disease Maternal Grandmother      Hypertension Maternal Grandmother      Blood Disease Maternal Grandmother      Respiratory Maternal Grandfather         TB     Alcohol/Drug Maternal Grandfather      Hypertension Sister      Heart Disease Sister      Gastrointestinal Disease Daughter      Neurologic Disorder Daughter         BRAIN TUMOR     Hypertension Daughter      Gastrointestinal Disease Daughter      Social History:  Marital Status:   [2]  Social History     Tobacco Use     Smoking status: Former     Types: Cigarettes     Quit date: 1975     Years since quittin.7     Smokeless tobacco: Never   Substance Use Topics     Alcohol use: Yes     Comment: OCC     Drug use: No      Medications:    alpha-lipoic acid 100 MG capsule  aspirin (ASA) 81 MG tablet  co-enzyme Q-10 (COQ10) 100 MG CAPS  fish oil-omega-3 fatty acids (FISH OIL) 1000 MG capsule  levothyroxine (SYNTHROID/LEVOTHROID) 100 MCG tablet  lisinopril (ZESTRIL) 10 MG  "tablet  losartan (COZAAR) 50 MG tablet  lovastatin (MEVACOR) 40 MG tablet  Lutein 20 MG CAPS  Menaquinone-7 (VITAMIN K2) 100 MCG CAPS  potassium chloride ER (KLOR-CON M) 10 MEQ CR tablet  thyroid (ARMOUR) 30 MG tablet  vitamin (B COMPLEX-C) tablet  vitamin C (ASCORBIC ACID) 1000 MG TABS  vitamin E 400 UNIT TABS      Review of Systems   All other systems reviewed and are negative.      PE   BP: (!) 165/92  Pulse: 71  Temp: 98.5  F (36.9  C)  Resp: 16  Height: 168.3 cm (5' 6.25\")  Weight: 66.2 kg (146 lb) (stated)  SpO2: 96 %  Physical Exam  Vitals reviewed.   Constitutional:       General: She is not in acute distress.     Appearance: She is well-developed.   HENT:      Head: Normocephalic and atraumatic.      Right Ear: External ear normal.      Left Ear: External ear normal.      Nose: Nose normal.      Mouth/Throat:      Mouth: Mucous membranes are moist.      Pharynx: Oropharynx is clear.   Eyes:      Extraocular Movements: Extraocular movements intact.      Conjunctiva/sclera: Conjunctivae normal.      Pupils: Pupils are equal, round, and reactive to light.   Cardiovascular:      Rate and Rhythm: Normal rate and regular rhythm.   Pulmonary:      Effort: Pulmonary effort is normal.   Musculoskeletal:         General: Normal range of motion.      Cervical back: Normal range of motion.   Skin:     General: Skin is warm and dry.   Neurological:      Mental Status: She is alert and oriented to person, place, and time.   Psychiatric:         Behavior: Behavior normal.         ED COURSE and MDM   1402.  Patient has symptoms and signs as described above.  CBC, comprehensive panel, troponin, EKG pending.  Vague symptoms starting yesterday after cleaning the chicken coop.  Current blood pressure 165/92.    1527.  Patient has stable blood pressure in the 150s and 160s systolic.  Unchanged clinically.  Blood work encouraging and unremarkable.  Consider increasing amlodipine from 2.5 mg daily to 5 mg daily.  Close follow-up " recommended, referral order placed.    EKG  (3590)   Interpretation performed by me.  Rate: 67     Rhythm: sinus     Axis: nl  Intervals: LA (12-2) 150, QRS (<12) 80, QTc (>5) 440  P wave: nl     QRS complex: nl   ST segment / T-wave: nl  Conclusion: nl    Electronic medical chart reviewed, including medical problems, medications, medical allergies, social history.  Recent hospitalizations and surgical procedures reviewed.  Recent clinic visits and consultations reviewed.  Recent labs and test results reviewed.  Nursing notes reviewed.    The patient, their parent if applicable, and/or their medical decision maker(s) and I have reviewed all of the available historical information, applicable PMH, physical exam findings, and objective diagnostic data gathered during this ED visit.  We then discussed all work-up options and then together agreed upon the course taken during this visit.  The ultimate disposition and plan was a cooperative decision made between myself and the patient, their parent if applicable, and/or their legal decision maker(s).  The risks and benefits of all decisions made during this visit were discussed to the best of my abilities given the circumstances, and all parties are understanding of the pertinent ramifications of these decisions.      LABS  Labs Ordered and Resulted from Time of ED Arrival to Time of ED Departure   CBC WITH PLATELETS AND DIFFERENTIAL - Abnormal       Result Value    WBC Count 4.2      RBC Count 4.51      Hemoglobin 13.3      Hematocrit 39.0      MCV 87      MCH 29.5      MCHC 34.1      RDW 12.4      Platelet Count 219      % Neutrophils 67      % Lymphocytes 15      % Monocytes 13      % Eosinophils 3      % Basophils 2      % Immature Granulocytes 0      NRBCs per 100 WBC 0      Absolute Neutrophils 2.9      Absolute Lymphocytes 0.6 (*)     Absolute Monocytes 0.5      Absolute Eosinophils 0.1      Absolute Basophils 0.1      Absolute Immature Granulocytes 0.0       Absolute NRBCs 0.0     COMPREHENSIVE METABOLIC PANEL - Normal    Sodium 140      Potassium 3.5      Chloride 103      Carbon Dioxide (CO2) 23      Anion Gap 14      Urea Nitrogen 8.9      Creatinine 0.83      Calcium 9.4      Glucose 83      Alkaline Phosphatase 69      AST 22      ALT 17      Protein Total 6.8      Albumin 4.3      Bilirubin Total 0.4      GFR Estimate 74     TROPONIN T, HIGH SENSITIVITY - Normal    Troponin T, High Sensitivity <6         IMAGING  Images reviewed by me.  Radiology report also reviewed.  No orders to display       Procedures    Medications - No data to display      IMPRESSION       ICD-10-CM    1. Hypertension, unspecified type  I10 Primary Care Referral               Medication List      There are no discharge medications for this visit.                     Eloy Lee MD  06/20/23 152

## 2023-06-20 NOTE — ED NOTES
I was asked evaluate patient urgently upon her arrival to the urgent care for chief complaint of elevated blood pressure.  Patient notes that she is currently undergoing a chemical detox reports her blood pressure had been down to 120s systolic, beginning yesterday evening she developed pinpoint pains throughout her body and was noted to have elevated blood pressure.  Blood pressure upon arrival was 165/92 with pulse rate of 71.  We discussed the typical limited scope of evaluation the urgent care versus transfer to the emergency department for further evaluation, actual blood work, EKG.  Patient agreed to evaluation in the emergency department.  She was transferred to the ED triage nurse.       Nuha Clements, PA-C  06/20/23 [id number]

## 2023-06-20 NOTE — DISCHARGE INSTRUCTIONS
RETURN TO THE EMERGENCY ROOM FOR THE FOLLOWING:    New chest pain, worsened breathing, severe headache, one-sided weakness or incoordination, new trouble with speech, facial droop, severe abdominal or pelvic pain, fainting, or at anytime for any concern.    FOLLOW UP:    With your primary clinic for further discussion regarding regular blood pressure management.  A referral order was placed at the time of your discharge.  Expect a phone call to help with scheduling.    TREATMENT RECOMMENDATIONS:    Increase amlodipine from 2.5 mg daily to 5 mg daily.  Check your blood pressure twice a day over the next 2 weeks and then follow up with your primary clinic.    NURSE ADVICE LINE:  [phone] or [phone]

## 2023-09-03 ENCOUNTER — HEALTH MAINTENANCE LETTER (OUTPATIENT)
Age: 73
End: 2023-09-03

## 2024-10-27 ENCOUNTER — HEALTH MAINTENANCE LETTER (OUTPATIENT)
Age: 74
End: 2024-10-27